# Patient Record
Sex: MALE | Race: BLACK OR AFRICAN AMERICAN | NOT HISPANIC OR LATINO | Employment: UNEMPLOYED | RURAL
[De-identification: names, ages, dates, MRNs, and addresses within clinical notes are randomized per-mention and may not be internally consistent; named-entity substitution may affect disease eponyms.]

---

## 2022-02-03 ENCOUNTER — IMMUNIZATION (OUTPATIENT)
Dept: FAMILY MEDICINE | Facility: CLINIC | Age: 62
End: 2022-02-03
Payer: COMMERCIAL

## 2022-02-03 DIAGNOSIS — Z23 NEED FOR VACCINATION: Primary | ICD-10-CM

## 2022-02-03 PROCEDURE — 91306 COVID-19, MRNA, LNP-S, PF, 100 MCG/0.25 ML DOSE VACCINE (MODERNA BOOSTER): ICD-10-PCS | Mod: ,,, | Performed by: FAMILY MEDICINE

## 2022-02-03 PROCEDURE — 0064A COVID-19, MRNA, LNP-S, PF, 100 MCG/0.25 ML DOSE VACCINE (MODERNA BOOSTER): ICD-10-PCS | Mod: ,,, | Performed by: FAMILY MEDICINE

## 2022-02-03 PROCEDURE — 0064A COVID-19, MRNA, LNP-S, PF, 100 MCG/0.25 ML DOSE VACCINE (MODERNA BOOSTER): CPT | Mod: ,,, | Performed by: FAMILY MEDICINE

## 2022-02-03 PROCEDURE — 91306 COVID-19, MRNA, LNP-S, PF, 100 MCG/0.25 ML DOSE VACCINE (MODERNA BOOSTER): CPT | Mod: ,,, | Performed by: FAMILY MEDICINE

## 2025-04-25 ENCOUNTER — HOSPITAL ENCOUNTER (INPATIENT)
Facility: HOSPITAL | Age: 65
LOS: 7 days | Discharge: HOME | DRG: 057 | End: 2025-05-02
Attending: HOSPITALIST | Admitting: HOSPITALIST
Payer: MEDICARE

## 2025-04-25 DIAGNOSIS — R53.81 DEBILITY: Primary | ICD-10-CM

## 2025-04-25 DIAGNOSIS — I63.9 CVA (CEREBRAL VASCULAR ACCIDENT): ICD-10-CM

## 2025-04-25 PROBLEM — Z91.89 AT RISK FOR IMPAIRED SKIN INTEGRITY: Status: ACTIVE | Noted: 2025-04-25

## 2025-04-25 PROBLEM — Z91.81 AT RISK FOR FALLS: Status: ACTIVE | Noted: 2025-04-25

## 2025-04-25 LAB
GLUCOSE SERPL-MCNC: 159 MG/DL (ref 70–105)
GLUCOSE SERPL-MCNC: 283 MG/DL (ref 70–105)

## 2025-04-25 PROCEDURE — 11000004 HC SNF PRIVATE

## 2025-04-25 PROCEDURE — 97162 PT EVAL MOD COMPLEX 30 MIN: CPT

## 2025-04-25 PROCEDURE — 27000987 HC MATTRESS, MATRIX LOW PROFILE

## 2025-04-25 PROCEDURE — 25000003 PHARM REV CODE 250: Performed by: NURSE PRACTITIONER

## 2025-04-25 PROCEDURE — 82962 GLUCOSE BLOOD TEST: CPT

## 2025-04-25 PROCEDURE — 27000958

## 2025-04-25 PROCEDURE — 63600175 PHARM REV CODE 636 W HCPCS: Performed by: NURSE PRACTITIONER

## 2025-04-25 RX ORDER — OMEPRAZOLE 20 MG/1
20 CAPSULE, DELAYED RELEASE ORAL DAILY
Status: ON HOLD | COMMUNITY
End: 2025-05-02 | Stop reason: HOSPADM

## 2025-04-25 RX ORDER — AMOXICILLIN 250 MG
1 CAPSULE ORAL 2 TIMES DAILY PRN
Status: DISCONTINUED | OUTPATIENT
Start: 2025-04-25 | End: 2025-05-02 | Stop reason: HOSPADM

## 2025-04-25 RX ORDER — DAPAGLIFLOZIN 10 MG/1
10 TABLET, FILM COATED ORAL DAILY
Status: ON HOLD | COMMUNITY
End: 2025-05-02

## 2025-04-25 RX ORDER — ATORVASTATIN CALCIUM 40 MG/1
40 TABLET, FILM COATED ORAL DAILY
Status: DISCONTINUED | OUTPATIENT
Start: 2025-04-26 | End: 2025-05-02 | Stop reason: HOSPADM

## 2025-04-25 RX ORDER — FLUTICASONE PROPIONATE 50 MCG
1 SPRAY, SUSPENSION (ML) NASAL 2 TIMES DAILY
Status: DISCONTINUED | OUTPATIENT
Start: 2025-04-26 | End: 2025-05-02 | Stop reason: HOSPADM

## 2025-04-25 RX ORDER — TAMSULOSIN HYDROCHLORIDE 0.4 MG/1
0.4 CAPSULE ORAL DAILY
Status: ON HOLD | COMMUNITY
End: 2025-05-02

## 2025-04-25 RX ORDER — ACETAMINOPHEN 325 MG/1
650 TABLET ORAL EVERY 6 HOURS PRN
Status: DISCONTINUED | OUTPATIENT
Start: 2025-04-25 | End: 2025-05-01

## 2025-04-25 RX ORDER — NAPROXEN SODIUM 220 MG/1
81 TABLET, FILM COATED ORAL DAILY
Status: ON HOLD | COMMUNITY
End: 2025-05-02 | Stop reason: HOSPADM

## 2025-04-25 RX ORDER — AMLODIPINE BESYLATE 10 MG/1
10 TABLET ORAL DAILY
Status: DISCONTINUED | OUTPATIENT
Start: 2025-04-26 | End: 2025-05-02 | Stop reason: HOSPADM

## 2025-04-25 RX ORDER — METHOCARBAMOL 500 MG/1
500 TABLET, FILM COATED ORAL EVERY 6 HOURS PRN
Status: DISCONTINUED | OUTPATIENT
Start: 2025-04-25 | End: 2025-05-02 | Stop reason: HOSPADM

## 2025-04-25 RX ORDER — GLUCAGON 1 MG
1 KIT INJECTION
Status: DISCONTINUED | OUTPATIENT
Start: 2025-04-25 | End: 2025-05-02 | Stop reason: HOSPADM

## 2025-04-25 RX ORDER — IBUPROFEN 200 MG
16 TABLET ORAL
Status: DISCONTINUED | OUTPATIENT
Start: 2025-04-25 | End: 2025-05-02 | Stop reason: HOSPADM

## 2025-04-25 RX ORDER — INSULIN ASPART 100 [IU]/ML
0-5 INJECTION, SOLUTION INTRAVENOUS; SUBCUTANEOUS
Status: DISCONTINUED | OUTPATIENT
Start: 2025-04-25 | End: 2025-05-02 | Stop reason: HOSPADM

## 2025-04-25 RX ORDER — TAMSULOSIN HYDROCHLORIDE 0.4 MG/1
0.4 CAPSULE ORAL DAILY
Status: DISCONTINUED | OUTPATIENT
Start: 2025-04-25 | End: 2025-05-02 | Stop reason: HOSPADM

## 2025-04-25 RX ORDER — METHOCARBAMOL 750 MG/1
750 TABLET, FILM COATED ORAL EVERY 6 HOURS PRN
Status: ON HOLD | COMMUNITY
End: 2025-05-02 | Stop reason: HOSPADM

## 2025-04-25 RX ORDER — FLUTICASONE PROPIONATE 50 MCG
1 SPRAY, SUSPENSION (ML) NASAL 2 TIMES DAILY
COMMUNITY

## 2025-04-25 RX ORDER — GABAPENTIN 400 MG/1
400 CAPSULE ORAL 3 TIMES DAILY
COMMUNITY

## 2025-04-25 RX ORDER — CALCIUM CARBONATE 200(500)MG
500 TABLET,CHEWABLE ORAL 2 TIMES DAILY PRN
Status: DISCONTINUED | OUTPATIENT
Start: 2025-04-25 | End: 2025-05-02 | Stop reason: HOSPADM

## 2025-04-25 RX ORDER — PANTOPRAZOLE SODIUM 40 MG/1
40 TABLET, DELAYED RELEASE ORAL DAILY
Status: DISCONTINUED | OUTPATIENT
Start: 2025-04-26 | End: 2025-05-02 | Stop reason: HOSPADM

## 2025-04-25 RX ORDER — AMLODIPINE BESYLATE 10 MG/1
10 TABLET ORAL DAILY
Status: ON HOLD | COMMUNITY
End: 2025-05-02

## 2025-04-25 RX ORDER — NAPROXEN SODIUM 220 MG/1
81 TABLET, FILM COATED ORAL ONCE
Status: COMPLETED | OUTPATIENT
Start: 2025-04-26 | End: 2025-04-26

## 2025-04-25 RX ORDER — IBUPROFEN 200 MG
24 TABLET ORAL
Status: DISCONTINUED | OUTPATIENT
Start: 2025-04-25 | End: 2025-05-02 | Stop reason: HOSPADM

## 2025-04-25 RX ORDER — ATORVASTATIN CALCIUM 40 MG/1
40 TABLET, FILM COATED ORAL DAILY
Status: ON HOLD | COMMUNITY
End: 2025-05-02

## 2025-04-25 RX ORDER — TALC
6 POWDER (GRAM) TOPICAL NIGHTLY PRN
Status: DISCONTINUED | OUTPATIENT
Start: 2025-04-25 | End: 2025-05-02 | Stop reason: HOSPADM

## 2025-04-25 RX ORDER — GABAPENTIN 400 MG/1
400 CAPSULE ORAL 3 TIMES DAILY
Status: DISCONTINUED | OUTPATIENT
Start: 2025-04-25 | End: 2025-04-28

## 2025-04-25 RX ADMIN — GABAPENTIN 400 MG: 400 CAPSULE ORAL at 08:04

## 2025-04-25 RX ADMIN — APIXABAN 5 MG: 5 TABLET, FILM COATED ORAL at 08:04

## 2025-04-25 RX ADMIN — INSULIN ASPART 1 UNITS: 100 INJECTION, SOLUTION INTRAVENOUS; SUBCUTANEOUS at 08:04

## 2025-04-25 RX ADMIN — METHOCARBAMOL 500 MG: 500 TABLET ORAL at 10:04

## 2025-04-25 NOTE — SUBJECTIVE & OBJECTIVE
Review of Systems   Constitutional:  Negative for chills and fever.   Eyes: Negative.    Respiratory: Negative.  Negative for apnea, cough, choking, chest tightness, shortness of breath, wheezing and stridor.    Cardiovascular: Negative.  Negative for chest pain, palpitations and leg swelling.   Gastrointestinal: Negative.    Genitourinary: Negative.    Musculoskeletal: Negative.    Skin: Negative.    Neurological:  Positive for weakness.   Psychiatric/Behavioral: Negative.     All other systems reviewed and are negative.    Objective:     Vital Signs (Most Recent):  Temp: 97.8 °F (36.6 °C) (04/25/25 1627)  Pulse: 75 (04/25/25 1627)  Resp: (!) 22 (04/25/25 1627)  BP: 134/88 (04/25/25 1627)  SpO2: 97 % (04/25/25 1627) Vital Signs (24h Range):  Temp:  [97.8 °F (36.6 °C)] 97.8 °F (36.6 °C)  Pulse:  [75] 75  Resp:  [22] 22  SpO2:  [97 %] 97 %  BP: (134)/(88) 134/88     Weight: 79.2 kg (174 lb 8 oz)  Body mass index is 29.95 kg/m².    Intake/Output Summary (Last 24 hours) at 4/25/2025 1741  Last data filed at 4/25/2025 1553  Gross per 24 hour   Intake --   Output 100 ml   Net -100 ml         Physical Exam  Vitals and nursing note reviewed.   Constitutional:       General: He is not in acute distress.     Appearance: Normal appearance. He is normal weight. He is not ill-appearing.   HENT:      Head: Normocephalic.      Mouth/Throat:      Mouth: Mucous membranes are moist.   Eyes:      General: No scleral icterus.     Extraocular Movements: Extraocular movements intact.      Conjunctiva/sclera: Conjunctivae normal.      Pupils: Pupils are equal, round, and reactive to light.   Cardiovascular:      Rate and Rhythm: Normal rate and regular rhythm.      Pulses: Normal pulses.      Heart sounds: Normal heart sounds. No murmur heard.     No friction rub. No gallop.   Pulmonary:      Effort: Pulmonary effort is normal.      Breath sounds: Normal breath sounds. No wheezing, rhonchi or rales.   Abdominal:      General: Abdomen is  "flat. Bowel sounds are normal. There is no distension.      Tenderness: There is no abdominal tenderness.   Musculoskeletal:         General: No tenderness. Normal range of motion.      Cervical back: Normal range of motion and neck supple. No rigidity.   Skin:     General: Skin is warm and dry.      Capillary Refill: Capillary refill takes less than 2 seconds.   Neurological:      Mental Status: He is alert.      GCS: GCS eye subscore is 4. GCS verbal subscore is 5. GCS motor subscore is 6.      Motor: Weakness present.      Comments: Right side weakness upper and lower extremity   Psychiatric:         Mood and Affect: Mood normal.         Behavior: Behavior normal.         Thought Content: Thought content normal.         Judgment: Judgment normal.               Significant Labs: All pertinent labs within the past 24 hours have been reviewed.  POCT Glucose: No results for input(s): "POCTGLUCOSE" in the last 48 hours.    Significant Imaging: I have reviewed all pertinent imaging results/findings within the past 24 hours.  "

## 2025-04-25 NOTE — PT/OT/SLP EVAL
Physical Therapy Evaluation    Patient Name:  Jono Keita   MRN:  [id number]    Recommendations:     Discharge Recommendations:     Discharge Equipment Recommendations: none   Barriers to discharge:  Poor functional mobility    Assessment:     Jono Keita is a 64 y.o. male admitted with a medical diagnosis of <principal problem not specified>.  He presents with the following impairments/functional limitations: weakness, impaired endurance, impaired functional mobility, gait instability, impaired balance .Patient needed min assist with gait with Rwx 100 feet. Patient has RLE weakness and mild RLE drag and circumduction with gait. Patient will benefit from gait , balance , strength and transfer training    Rehab Prognosis: Good; patient would benefit from acute skilled PT services to address these deficits and reach maximum level of function.    Recent Surgery: * No surgery found *      Plan:     During this hospitalization, patient to be seen 5 x/week to address the identified rehab impairments via gait training, therapeutic activities, therapeutic exercises, neuromuscular re-education, wheelchair management/training and progress toward the following goals:    Plan of Care Expires:  05/23/25    Subjective     Chief Complaint: Difficulty with gait  Patient/Family Comments/goals: patient wants to walk without assistance.   Pain/Comfort:  Pain Rating 1: 0/10  Pain Rating Post-Intervention 1: 0/10    Patients cultural, spiritual, Spiritism conflicts given the current situation: yes    Living Environment:  Patient lives in a mobile Home with wife.  Prior to admission, patients level of function was independent with community ambulation.  Equipment used at home:  .  DME owned (not currently used): none.  Upon discharge, patient will have assistance from wife.    Objective:     Communicated with RN prior to session.  Patient found supine with    upon PT entry to room.    General Precautions: Standard, fall  Orthopedic  Precautions:N/A   Braces: N/A  Respiratory Status: Room air    Exams:  RUE Strength: 4-/5  LUE Strength: 4/5  RLE Strength: 3//5 DF and knee flexion 3-/5  LLE Strength: 4/5    Functional Mobility:  Bed Mobility:     Supine to Sit: stand by assistance  Transfers:     Sit to Stand:  minimum assistance with rolling walker  Gait: min assist x 100 feet with RW with mild LE drag and circumduction      AM-PAC 6 CLICK MOBILITY  Total Score:18       Treatment & Education:  Initial Evaluation performed    Patient left up in chair with all lines intact and call button in reach.    GOALS:   Multidisciplinary Problems       Physical Therapy Goals          Problem: Physical Therapy    Goal Priority Disciplines Outcome Interventions   Physical Therapy Goal     PT, PT/OT     Description: Short Term Goals (1 Week)  Patient will be modified independent with sit to stand with use of RW.     Long Term Goals (4 Weeks)  Patient will need CGA with gait with straight cane  x 350 feet on level surfaces.  Patient will be independent with commode transfer.  Patient will need SBA with negotiating 4 steps with rail .                        DME Justifications:  TBD    History:     History reviewed. No pertinent past medical history.    Past Surgical History:   Procedure Laterality Date    BACK SURGERY      KNEE SURGERY         Time Tracking:     PT Received On: 04/25/25  PT Start Time: 1520     PT Stop Time: 1550  PT Total Time (min): 30 min     Billable Minutes: Evaluation 30      04/25/2025

## 2025-04-25 NOTE — HOSPITAL COURSE
4/25 Patient sitting up in WC talking on phone in NAD with wife at bedside. Patient is pleasant an answers questions appropriately. He and his wife express understanding of plan of treatment and agree with plan.    5.2 Did really well and has improved.  Stable for DC home. Follow up with PCP.  DC with Farxiga instead of Lantus.  But PCP can track and see if more needed.

## 2025-04-25 NOTE — PROGRESS NOTES
[facility] Medicine  Progress Note    Patient Name: Jono Keita  MRN: [id number]  Patient Class: IP- Swing   Admission Date: 4/25/2025  Length of Stay: 0 days  Attending Physician: Rico Braden DO  Primary Care Provider: Leon Barr MD        Subjective     Principal Problem:<principal problem not specified>        HPI:  Mr. Keita is a 65 y/o AAM with PMH of HTN, T2DM, Stage 3 CKD, CVA, BPH, and HLD who presents from [facility] to [facility] for PT/OT/ST due to debility related to recent CVA. Patient suffered a left pontine stroke on 4/12/25. Patient states an additional previous stroke in the past.  His recent stroke resulted in residual right hemiparesis.    Overview/Hospital Course:  4/25 Patient sitting up in WC talking on phone in NAD with wife at bedside. Patient is pleasant an answers questions appropriately. He and his wife express understanding of plan of treatment and agree with plan.    Review of Systems   Constitutional:  Negative for chills and fever.   Eyes: Negative.    Respiratory: Negative.  Negative for apnea, cough, choking, chest tightness, shortness of breath, wheezing and stridor.    Cardiovascular: Negative.  Negative for chest pain, palpitations and leg swelling.   Gastrointestinal: Negative.    Genitourinary: Negative.    Musculoskeletal: Negative.    Skin: Negative.    Neurological:  Positive for weakness.   Psychiatric/Behavioral: Negative.     All other systems reviewed and are negative.    Objective:     Vital Signs (Most Recent):  Temp: 97.8 °F (36.6 °C) (04/25/25 1627)  Pulse: 75 (04/25/25 1627)  Resp: (!) 22 (04/25/25 1627)  BP: 134/88 (04/25/25 1627)  SpO2: 97 % (04/25/25 1627) Vital Signs (24h Range):  Temp:  [97.8 °F (36.6 °C)] 97.8 °F (36.6 °C)  Pulse:  [75] 75  Resp:  [22] 22  SpO2:  [97 %] 97 %  BP: (134)/(88) 134/88     Weight: 79.2 kg (174 lb 8 oz)  Body mass index is 29.95 kg/m².    Intake/Output Summary  "(Last 24 hours) at 4/25/2025 1741  Last data filed at 4/25/2025 1553  Gross per 24 hour   Intake --   Output 100 ml   Net -100 ml         Physical Exam  Vitals and nursing note reviewed.   Constitutional:       General: He is not in acute distress.     Appearance: Normal appearance. He is normal weight. He is not ill-appearing.   HENT:      Head: Normocephalic.      Mouth/Throat:      Mouth: Mucous membranes are moist.   Eyes:      General: No scleral icterus.     Extraocular Movements: Extraocular movements intact.      Conjunctiva/sclera: Conjunctivae normal.      Pupils: Pupils are equal, round, and reactive to light.   Cardiovascular:      Rate and Rhythm: Normal rate and regular rhythm.      Pulses: Normal pulses.      Heart sounds: Normal heart sounds. No murmur heard.     No friction rub. No gallop.   Pulmonary:      Effort: Pulmonary effort is normal.      Breath sounds: Normal breath sounds. No wheezing, rhonchi or rales.   Abdominal:      General: Abdomen is flat. Bowel sounds are normal. There is no distension.      Tenderness: There is no abdominal tenderness.   Musculoskeletal:         General: No tenderness. Normal range of motion.      Cervical back: Normal range of motion and neck supple. No rigidity.   Skin:     General: Skin is warm and dry.      Capillary Refill: Capillary refill takes less than 2 seconds.   Neurological:      Mental Status: He is alert.      GCS: GCS eye subscore is 4. GCS verbal subscore is 5. GCS motor subscore is 6.      Motor: Weakness present.      Comments: Right side weakness upper and lower extremity   Psychiatric:         Mood and Affect: Mood normal.         Behavior: Behavior normal.         Thought Content: Thought content normal.         Judgment: Judgment normal.               Significant Labs: All pertinent labs within the past 24 hours have been reviewed.  POCT Glucose: No results for input(s): "POCTGLUCOSE" in the last 48 hours.    Significant Imaging: I have " reviewed all pertinent imaging results/findings within the past 24 hours.      Assessment & Plan  At risk for falls      At risk for venous thromboembolism (VTE)  - Maintain skin and mucous membrane integrity  - Promote hand, oral, and pulmonary hygiene  - Optimize fluid balance, nutrition, sleep, glycemic control to maximize infection resistance  - Identify patient sources of infection early to prevent or mitigate progression of infection        VTE Risk Mitigation (From admission, onward)           Ordered     apixaban tablet 5 mg  2 times daily         04/25/25 1500     IP VTE LOW RISK PATIENT  Once         04/25/25 1459                    Discharge Planning   SIN:      Code Status: Full Code   Medical Readiness for Discharge Date:                    Please place Justification for DME        JENNIFER Calvo  Department of Hospital Medicine   [facility]

## 2025-04-25 NOTE — ASSESSMENT & PLAN NOTE
- Maintain skin and mucous membrane integrity  - Promote hand, oral, and pulmonary hygiene  - Optimize fluid balance, nutrition, sleep, glycemic control to maximize infection resistance  - Identify patient sources of infection early to prevent or mitigate progression of infection

## 2025-04-25 NOTE — PLAN OF CARE
Short Term Goals (1 Week)  Patient will be modified independent with sit to stand with use of RW.     Long Term Goals (4 Weeks)  Patient will need CGA with gait with straight cane  x 350 feet on level surfaces.  Patient will be independent with commode transfer.  Patient will need SBA with negotiating 4 steps with rail .

## 2025-04-25 NOTE — HPI
Mr. Keita is a 65 y/o AAM with PMH of HTN, T2DM, Stage 3 CKD, CVA, BPH, and HLD who presents from [facility] to [facility] for PT/OT/ST due to debility related to recent CVA. Patient suffered a left pontine stroke on 4/12/25. Patient states an additional previous stroke in the past.  His recent stroke resulted in residual right hemiparesis.

## 2025-04-26 LAB
ANION GAP SERPL CALCULATED.3IONS-SCNC: 15 MMOL/L (ref 7–16)
BASOPHILS # BLD AUTO: 0.01 K/UL (ref 0–0.2)
BASOPHILS NFR BLD AUTO: 0.2 % (ref 0–1)
BUN SERPL-MCNC: 33 MG/DL (ref 8–26)
BUN/CREAT SERPL: 19 (ref 6–20)
CALCIUM SERPL-MCNC: 8.8 MG/DL (ref 8.8–10)
CHLORIDE SERPL-SCNC: 110 MMOL/L (ref 98–107)
CO2 SERPL-SCNC: 20 MMOL/L (ref 23–31)
CREAT SERPL-MCNC: 1.7 MG/DL (ref 0.72–1.25)
DIFFERENTIAL METHOD BLD: ABNORMAL
EGFR (NO RACE VARIABLE) (RUSH/TITUS): 44 ML/MIN/1.73M2
EOSINOPHIL # BLD AUTO: 0.11 K/UL (ref 0–0.5)
EOSINOPHIL NFR BLD AUTO: 2.2 % (ref 1–4)
ERYTHROCYTE [DISTWIDTH] IN BLOOD BY AUTOMATED COUNT: 13.1 % (ref 11.5–14.5)
GLUCOSE SERPL-MCNC: 164 MG/DL (ref 82–115)
GLUCOSE SERPL-MCNC: 249 MG/DL (ref 70–105)
GLUCOSE SERPL-MCNC: 253 MG/DL (ref 70–105)
GLUCOSE SERPL-MCNC: 263 MG/DL (ref 70–105)
HCT VFR BLD AUTO: 44.2 % (ref 40–54)
HGB BLD-MCNC: 14.5 G/DL (ref 13.5–18)
LYMPHOCYTES # BLD AUTO: 1.19 K/UL (ref 1–4.8)
LYMPHOCYTES NFR BLD AUTO: 23.6 % (ref 27–41)
MCH RBC QN AUTO: 29 PG (ref 27–31)
MCHC RBC AUTO-ENTMCNC: 32.8 G/DL (ref 32–36)
MCV RBC AUTO: 88.4 FL (ref 80–96)
MONOCYTES # BLD AUTO: 0.62 K/UL (ref 0–0.8)
MONOCYTES NFR BLD AUTO: 12.3 % (ref 2–6)
MPC BLD CALC-MCNC: 9.5 FL (ref 9.4–12.4)
NEUTROPHILS # BLD AUTO: 3.11 K/UL (ref 1.8–7.7)
NEUTROPHILS NFR BLD AUTO: 61.7 % (ref 53–65)
PLATELET # BLD AUTO: 211 K/UL (ref 150–400)
POTASSIUM SERPL-SCNC: 4.8 MMOL/L (ref 3.5–5.1)
RBC # BLD AUTO: 5 M/UL (ref 4.6–6.2)
SODIUM SERPL-SCNC: 140 MMOL/L (ref 136–145)
WBC # BLD AUTO: 5.04 K/UL (ref 4.5–11)

## 2025-04-26 PROCEDURE — 11000004 HC SNF PRIVATE

## 2025-04-26 PROCEDURE — 25000003 PHARM REV CODE 250: Performed by: NURSE PRACTITIONER

## 2025-04-26 PROCEDURE — 63600175 PHARM REV CODE 636 W HCPCS: Performed by: NURSE PRACTITIONER

## 2025-04-26 PROCEDURE — 27000958

## 2025-04-26 PROCEDURE — 80048 BASIC METABOLIC PNL TOTAL CA: CPT | Performed by: NURSE PRACTITIONER

## 2025-04-26 PROCEDURE — 25000242 PHARM REV CODE 250 ALT 637 W/ HCPCS: Performed by: NURSE PRACTITIONER

## 2025-04-26 PROCEDURE — 85025 COMPLETE CBC W/AUTO DIFF WBC: CPT | Performed by: NURSE PRACTITIONER

## 2025-04-26 PROCEDURE — 36415 COLL VENOUS BLD VENIPUNCTURE: CPT | Performed by: NURSE PRACTITIONER

## 2025-04-26 PROCEDURE — 27000987 HC MATTRESS, MATRIX LOW PROFILE

## 2025-04-26 PROCEDURE — 82962 GLUCOSE BLOOD TEST: CPT

## 2025-04-26 RX ADMIN — INSULIN ASPART 3 UNITS: 100 INJECTION, SOLUTION INTRAVENOUS; SUBCUTANEOUS at 11:04

## 2025-04-26 RX ADMIN — AMLODIPINE BESYLATE 10 MG: 10 TABLET ORAL at 08:04

## 2025-04-26 RX ADMIN — APIXABAN 5 MG: 5 TABLET, FILM COATED ORAL at 08:04

## 2025-04-26 RX ADMIN — GABAPENTIN 400 MG: 400 CAPSULE ORAL at 08:04

## 2025-04-26 RX ADMIN — GABAPENTIN 400 MG: 400 CAPSULE ORAL at 03:04

## 2025-04-26 RX ADMIN — ASPIRIN 81 MG: 81 TABLET, CHEWABLE ORAL at 08:04

## 2025-04-26 RX ADMIN — TAMSULOSIN HYDROCHLORIDE 0.4 MG: 0.4 CAPSULE ORAL at 08:04

## 2025-04-26 RX ADMIN — PANTOPRAZOLE SODIUM 40 MG: 40 TABLET, DELAYED RELEASE ORAL at 08:04

## 2025-04-26 RX ADMIN — FLUTICASONE PROPIONATE 50 MCG: 50 SPRAY, METERED NASAL at 08:04

## 2025-04-26 RX ADMIN — ATORVASTATIN CALCIUM 40 MG: 40 TABLET, FILM COATED ORAL at 08:04

## 2025-04-26 RX ADMIN — INSULIN ASPART 1 UNITS: 100 INJECTION, SOLUTION INTRAVENOUS; SUBCUTANEOUS at 08:04

## 2025-04-26 RX ADMIN — INSULIN ASPART 2 UNITS: 100 INJECTION, SOLUTION INTRAVENOUS; SUBCUTANEOUS at 05:04

## 2025-04-26 RX ADMIN — METHOCARBAMOL 500 MG: 500 TABLET ORAL at 08:04

## 2025-04-26 NOTE — PLAN OF CARE
Problem: Adult Inpatient Plan of Care  Goal: Plan of Care Review  Outcome: Progressing  Goal: Patient-Specific Goal (Individualized)  Outcome: Progressing  Goal: Absence of Hospital-Acquired Illness or Injury  Outcome: Progressing  Goal: Optimal Comfort and Wellbeing  Outcome: Progressing  Goal: Readiness for Transition of Care  Outcome: Progressing     Problem: Fall Injury Risk  Goal: Absence of Fall and Fall-Related Injury  Outcome: Progressing     Problem: Stroke Rehabilitation  Goal: Optimal Adjustment to Stroke  Outcome: Progressing  Goal: Optimal Safe BADL Performance  Outcome: Progressing  Goal: Effective Bowel Elimination/Continence  Outcome: Progressing  Goal: Optimal Cognitive Function  Outcome: Progressing  Goal: Effective Communication Skills  Outcome: Progressing  Goal: Optimal Safe IADL Performance  Outcome: Progressing  Goal: Optimal Mobility [place] and Safety  Outcome: Progressing  Goal: Optimal Movement and Motor Control  Outcome: Progressing  Goal: Optimal Nutrition Intake  Outcome: Progressing  Goal: Optimal Sensory Perceptual Status  Outcome: Progressing  Goal: Maintains Intimacy/Sexual Expression  Outcome: Progressing  Goal: Effective Spasticity Management  Outcome: Progressing  Goal: Safe and Effective Swallow  Outcome: Progressing  Goal: Effective Urinary Elimination/Continence  Outcome: Progressing

## 2025-04-26 NOTE — NURSING
Pt's f/u appointments are listed below:     5/7 Ramos Christian NP @2 PM ([facility])    7/30 Mike Nation MD @11 AM (Urology)      Appointments added to AVS and updated white board in room. Family made aware of appointments.

## 2025-04-27 LAB
GLUCOSE SERPL-MCNC: 176 MG/DL (ref 70–105)
GLUCOSE SERPL-MCNC: 242 MG/DL (ref 70–105)
GLUCOSE SERPL-MCNC: 247 MG/DL (ref 70–105)
GLUCOSE SERPL-MCNC: 392 MG/DL (ref 70–105)

## 2025-04-27 PROCEDURE — 97166 OT EVAL MOD COMPLEX 45 MIN: CPT

## 2025-04-27 PROCEDURE — 82962 GLUCOSE BLOOD TEST: CPT

## 2025-04-27 PROCEDURE — 27000958

## 2025-04-27 PROCEDURE — 11000004 HC SNF PRIVATE

## 2025-04-27 PROCEDURE — 63600175 PHARM REV CODE 636 W HCPCS: Performed by: NURSE PRACTITIONER

## 2025-04-27 PROCEDURE — 27000987 HC MATTRESS, MATRIX LOW PROFILE

## 2025-04-27 PROCEDURE — 25000003 PHARM REV CODE 250: Performed by: NURSE PRACTITIONER

## 2025-04-27 RX ADMIN — APIXABAN 5 MG: 5 TABLET, FILM COATED ORAL at 08:04

## 2025-04-27 RX ADMIN — METHOCARBAMOL 500 MG: 500 TABLET ORAL at 08:04

## 2025-04-27 RX ADMIN — ATORVASTATIN CALCIUM 40 MG: 40 TABLET, FILM COATED ORAL at 08:04

## 2025-04-27 RX ADMIN — PANTOPRAZOLE SODIUM 40 MG: 40 TABLET, DELAYED RELEASE ORAL at 08:04

## 2025-04-27 RX ADMIN — FLUTICASONE PROPIONATE 50 MCG: 50 SPRAY, METERED NASAL at 08:04

## 2025-04-27 RX ADMIN — INSULIN ASPART 2 UNITS: 100 INJECTION, SOLUTION INTRAVENOUS; SUBCUTANEOUS at 10:04

## 2025-04-27 RX ADMIN — GABAPENTIN 400 MG: 400 CAPSULE ORAL at 08:04

## 2025-04-27 RX ADMIN — INSULIN ASPART 2 UNITS: 100 INJECTION, SOLUTION INTRAVENOUS; SUBCUTANEOUS at 04:04

## 2025-04-27 RX ADMIN — AMLODIPINE BESYLATE 10 MG: 10 TABLET ORAL at 08:04

## 2025-04-27 RX ADMIN — INSULIN ASPART 3 UNITS: 100 INJECTION, SOLUTION INTRAVENOUS; SUBCUTANEOUS at 08:04

## 2025-04-27 RX ADMIN — GABAPENTIN 400 MG: 400 CAPSULE ORAL at 03:04

## 2025-04-27 RX ADMIN — TAMSULOSIN HYDROCHLORIDE 0.4 MG: 0.4 CAPSULE ORAL at 08:04

## 2025-04-27 NOTE — PLAN OF CARE
Problem: Occupational Therapy  Goal: Occupational Therapy Goal  Description: STG:  Pt will perform grooming with setup  Pt will bathe with I after set up  Pt will perform UE dressing with I  Pt will perform LE dressing with I  Pt will sit EOB x 15 min with no assistance  Pt will transfer bed/chair/bsc with Mod I  Pt will perform standing task x 15 min with supervision assistance  Pt will tolerate 45 minutes of tx without fatigue      LT.Restore to max I with self care and mobility.    Outcome: Progressing

## 2025-04-27 NOTE — PLAN OF CARE
Problem: Adult Inpatient Plan of Care  Goal: Plan of Care Review  Outcome: Progressing  Goal: Patient-Specific Goal (Individualized)  Outcome: Progressing  Goal: Absence of Hospital-Acquired Illness or Injury  Outcome: Progressing  Goal: Optimal Comfort and Wellbeing  Outcome: Progressing  Goal: Readiness for Transition of Care  Outcome: Progressing     Problem: Fall Injury Risk  Goal: Absence of Fall and Fall-Related Injury  Outcome: Progressing     Problem: Stroke Rehabilitation  Goal: Optimal Adjustment to Stroke  Outcome: Progressing  Goal: Optimal Safe BADL Performance  Outcome: Progressing  Goal: Effective Bowel Elimination/Continence  Outcome: Progressing  Goal: Optimal Cognitive Function  Outcome: Progressing  Goal: Effective Communication Skills  Outcome: Progressing  Goal: Optimal Safe IADL Performance  Outcome: Progressing  Goal: Optimal Mobility [place] and Safety  Outcome: Progressing  Goal: Optimal Movement and Motor Control  Outcome: Progressing  Goal: Optimal Nutrition Intake  Outcome: Progressing  Goal: Optimal Sensory Perceptual Status  Outcome: Progressing  Goal: Maintains Intimacy/Sexual Expression  Outcome: Progressing  Goal: Effective Spasticity Management  Outcome: Progressing  Goal: Safe and Effective Swallow  Outcome: Progressing  Goal: Effective Urinary Elimination/Continence  Outcome: Progressing

## 2025-04-27 NOTE — PLAN OF CARE
Problem: Adult Inpatient Plan of Care  Goal: Plan of Care Review  Outcome: Progressing  Goal: Patient-Specific Goal (Individualized)  Outcome: Progressing  Goal: Absence of Hospital-Acquired Illness or Injury  Outcome: Progressing  Goal: Optimal Comfort and Wellbeing  Outcome: Progressing  Goal: Readiness for Transition of Care  Outcome: Progressing     Problem: Fall Injury Risk  Goal: Absence of Fall and Fall-Related Injury  Outcome: Progressing     Problem: Stroke Rehabilitation  Goal: Optimal Adjustment to Stroke  Outcome: Progressing  Goal: Optimal Safe BADL Performance  Outcome: Progressing  Goal: Effective Bowel Elimination/Continence  Outcome: Progressing  Goal: Optimal Cognitive Function  Outcome: Progressing  Goal: Effective Communication Skills  Outcome: Progressing  Goal: Optimal Safe IADL Performance  Outcome: Progressing  Goal: Optimal Mobility [place] and Safety  Outcome: Progressing  Goal: Optimal Movement and Motor Control  Outcome: Progressing  Goal: Optimal Nutrition Intake  Outcome: Progressing  Goal: Optimal Sensory Perceptual Status  Outcome: Progressing  Goal: Maintains Intimacy/Sexual Expression  Outcome: Progressing  Goal: Effective Spasticity Management  Outcome: Progressing  Goal: Safe and Effective Swallow  Outcome: Progressing  Goal: Effective Urinary Elimination/Continence  Outcome: Progressing

## 2025-04-27 NOTE — CONSULTS
[facility]  Adult Nutrition  First Assessment Note         Reason for Assessment  Reason For Assessment: consult        Assessment and Plan  Consult received and appreciated. Consult to assess/educate regarding nutritional needs. Patient is a 65 yo M with PMH of HTN, T2DM, Stage 3 CKD, CVA, BPH, and HLD who presents from [facility] due to debility related to recent CVA. Patient suffered a left pontine stroke on 4/12/25. Patient noted to have additional previous stroke in past. Recent stroke resulted in residual right hemiparesis.    Patient is 79.1 kg (174 lb) with a BMI of 29.95 and is overweight. Chart review reveals limited weight history. Per malnutrition screening tool, patient denied weight loss and poor appetite (MST 0). Patient is currently on a Consistent Carbohydrate Diet and is consuming 100% of meals per flowsheet.     Nutrition Recommendations  Recommend to change diet order to Heart-Healthy, Consistent Carbohydrate Diet given patient admitted secondary to stroke with history of additional strokes. Patient would benefit from diet education related to a cardiac, diabetic-friendly diet upon follow-up when next on-site prior to discharge. Will attach nutrition education materials to discharge instructions for time being.     Last Bowel Movement: 04/25/25    Medications/labs reviewed. RD following.    Learning Needs/Social Determinants of Health    Learning Assessment       04/25/2025 1505 [facility] (4/25/2025 - Present)   Created by Rozina Burger, RN - RN (Nurse) Status: Complete                 PRIMARY LEARNER     Primary Learner Name:  Jono Keita  - 04/25/2025 1505    Relationship:  Patient AG - 04/25/2025 1505    Does the primary learner have any barriers to learning?:  No Barriers  - 04/25/2025 1505    What is the preferred language of the primary learner?:  English  - 04/25/2025 1505    Is an  required?:  No   - 04/25/2025 1505    How does the primary learner prefer to learn new concepts?:  Listening  - 04/25/2025 1505    How often do you need to have someone help you read instructions, pamphlets, or written material from your doctor or pharmacy?:  Never  - 04/25/2025 1505        CO-LEARNER #1     No question answered        CO-LEARNER #2     No question answered        SPECIAL TOPICS     No question answered        ANSWERED BY:     No question answered        Comments         Edit History       Rozina Burger RN - RN (Nurse)   04/25/2025 1505                          Social Drivers of Health     Tobacco Use: Medium Risk (4/25/2025)    Patient History     Smoking Tobacco Use: Unknown     Smokeless Tobacco Use: Former     Passive Exposure: Not on file   Alcohol Use: Alcohol Misuse (4/12/2025)    Received from [facility] Subsidi[facility] Aff[facility]    AUDIT-C     Frequency of Alcohol Consumption: 2-3 times a week     Average Number of Drinks: 1 or 2     Frequency of Binge Drinking: Weekly   Financial Resource Strain: Low Risk  (4/25/2025)    Overall Financial Resource Strain (CARDIA)     Difficulty of Paying Living Expenses: Not very hard   Food Insecurity: No Food Insecurity (4/25/2025)    Hunger Vital Sign     Worried About Running Out of Food in the Last Year: Never true     Ran Out of Food in the Last Year: Never true   Transportation Needs: No Transportation Needs (4/25/2025)    PRAPARE - Transportation     Lack of Transportation (Medical): No     Lack of Transportation (Non-Medical): No   Physical Activity: Not on file   Stress: No Stress Concern Present (4/25/2025)    [employer] - Occupational Stress Questionnaire     Feeling of Stress : Only a little   Housing Stability: Low Risk  (4/25/2025)    Housing Stability Vital Sign     Unable to Pay for Housing in the Last Year: No     Number of Times Moved in the Last Year: Not on file      Homeless in the Last Year: No   Depression: Not at risk (4/12/2025)    Received from [facility]sidiaries and Affiliates    PHQ-2     PHQ-2 Total: 0   Utilities: Not At Risk (4/25/2025)    [facility] Utilities     Threatened with loss of utilities: No   Health Literacy: Adequate Health Literacy (4/25/2025)     Health Literacy     Frequency of need for help with medical instructions: Rarely   Social Isolation: Not on file     Malnutrition  Is Patient Malnourished: No    Nutrition Diagnosis  Decreased nutrient needs (sodium, saturated fat) related to cardiac dysfunction as evidenced by multiple strokes, HTN    Recent Labs   Lab 04/26/25  0504 04/26/25  1058 04/26/25  1655   *  --   --    POCGLU  --    < > 249*    < > = values in this interval not displayed.     Comments on Glucose: [facility] T2DM    Nutrition Prescription / Recommendations  Recommendation/Intervention: Recommend to change diet order to Heart-Healthy, Consistent CHO. Patient would benefit from diet education related to heart-healthy, diabetic nutrition therapy prior to discharge  Goals: Weight maintenance during admission, consuming 50 - 75 % meals, improve blood glucose levels  Nutrition Goal Status: new  Current Diet Order: Consistent Carbohydrate Diet  Chewing or Swallowing Difficulty?: No Chewing or swallowing difficulty  Recommended Diet: Consistent Carbohydrate 2000 (75g Carbs) and Heart Healthy  Recommended Oral Supplement: No Oral Supplements  Is Nutrition Support Recommended: No  Is Nutrition Education Recommended: Yes - Type: Heart-Healthy, Diabetic Medical Nutrition Therapy - Education Given: no - not on site currently    Monitor and Evaluation  % current Intake: P.O. intake of 75 - 100 %  % intake to meet estimated needs: 50 - 75 %  Monitor and Evaluation: Food and beverage intake, Protein intake, Carbohydrate intake, Diet order, Food and nutrition knowledge, Weight, Beliefs and attitudes,  "Electrolyte and renal panel, Gastrointestinal profile, Glucose/endocrine profile, Inflammatory profile, Lipid profile, Nutrition focused physical findings    Current Medical Diagnosis and Past Medical History     History reviewed. No pertinent past medical history.    Nutrition/Diet History  Spiritual, Cultural Beliefs, Mandaeism Practices, Values that Affect Care: yes  Food Allergies: NKFA    Lab/Procedures/Meds  Recent Labs   Lab 04/26/25  0504      K 4.8   BUN 33*   CREATININE 1.70*   CALCIUM 8.8   *   Note: BUN, Cr high (Stage 3 CKD). Cl- high (possibly related to renal dysfunction, meds)    Last A1c:   Lab Results   Component Value Date    HGBA1C 9.3 (H) 04/13/2025   Note: A1c elevated. Goal for individuals with diabetes < 7     Lab Results   Component Value Date    RBC 5.00 04/26/2025    HGB 14.5 04/26/2025    HCT 44.2 04/26/2025    MCV 88.4 04/26/2025    MCH 29.0 04/26/2025    MCHC 32.8 04/26/2025     Pertinent Labs Reviewed: reviewed  Pertinent Medications Reviewed: reviewed    Scheduled Meds:   amLODIPine  10 mg Oral Daily    apixaban  5 mg Oral BID    atorvastatin  40 mg Oral Daily    fluticasone propionate  1 spray Each Nostril BID    gabapentin  400 mg Oral TID    pantoprazole  40 mg Oral Daily    tamsulosin  0.4 mg Oral Daily   Note: atorvastatin (not with grapefruit/grapefruit juice)    Continuous Infusions:  PRN Meds:.  Current Facility-Administered Medications:     acetaminophen, 650 mg, Oral, Q6H PRN    calcium carbonate, 500 mg, Oral, BID PRN    dextrose 50%, 12.5 g, Intravenous, PRN    dextrose 50%, 25 g, Intravenous, PRN    glucagon (human recombinant), 1 mg, Intramuscular, PRN    glucose, 16 g, Oral, PRN    glucose, 24 g, Oral, PRN    insulin aspart U-100, 0-5 Units, Subcutaneous, QID (AC + HS) PRN    melatonin, 6 mg, Oral, Nightly PRN    methocarbamoL, 500 mg, Oral, Q6H PRN    senna-docusate, 1 tablet, Oral, BID PRN    Anthropometrics  Height: 5' 4" (162.6 cm)  Height (inches): 64 " in  Height Method: Stated  Weight: 79.1 kg (174 lb 7.6 oz)  Weight (lb): 174.48 lb  Weight Method: Bed Scale  Ideal Body Weight (IBW), Male: 130 lb  % Ideal Body Weight, Male (lb): 134.23 %  BMI (Calculated): 29.9       Estimated/Assessed Needs  RMR ([place]-St. Jeor Equation): 1487.41     Temp: 97.8 °F (36.6 °C)Oral  Weight Used For Calorie Calculations: 79.1 kg (174 lb 6.1 oz)     Energy Calorie Requirements (kcal): 2,000 - 2,400 kcal (25 - 30 kcal/kg ABW)    Weight Used For Protein Calculations: 79.1 kg (174 lb 6.1 oz)  Protein Requirements: 79 - 95 g (1 - 1.2 g/kg ABW) (increased protein needs 2/2 older age, preservation muscle mass)      Fluid Requirements (mL): 1 mL/kcal  CHO Requirement: 45 - 55 % total kcal (T2DM)    Nutrition by Nursing  Diet/Nutrition Received: consistent carb/diabetic diet  Intake (%): 100%  Diet/Feeding Assistance: tray set-up  Diet/Feeding Tolerance: good                Nutrition Follow-Up  RD Follow-up?: Yes    Nutrition Discharge Planning: Therapeutic diet (comments)  Therapeutic diet (comments): Heart-Healthy, Consistent-Carbohydrate Diet - diet low in saturated fats, low in sodium, and high in fiber. Encourage patient to make half of his plate non-starchy vegetables with quarter of plate lean protein and other quarter whole grains, fruits, starchy vegetables. Avoid surgary beverages. Try to consume a similar amount of carbohydrates at each meal with a source of fiber/protein to help stabilize blood glucose levels. Patient would benefit from working with outpatient dietitian if interested    Jessica Aparicio MS, RD, LD  Available via Secure Chat

## 2025-04-27 NOTE — PT/OT/SLP EVAL
Occupational Therapy   Evaluation    Name: Jono Keita  MRN: [id number]  Admitting Diagnosis: <principal problem not specified>  Recent Surgery: * No surgery found *      Recommendations:     Discharge Recommendations: Moderate Intensity Therapy  Discharge Equipment Recommendations:  none  Barriers to discharge:  None    Assessment:     Jono Keita is a 64 y.o. male with a medical diagnosis of s/p L CVA with R sided residual weakness in .  He presents with weakness. Performance deficits affecting function: weakness, impaired endurance, impaired self care skills, impaired functional mobility, gait instability, impaired balance, decreased coordination.      Rehab Prognosis: Good; patient would benefit from acute skilled OT services to address these deficits and reach maximum level of function.       Plan:     Patient to be seen 5 x/week to address the above listed problems via self-care/home management, therapeutic activities, therapeutic exercises, neuromuscular re-education  Plan of Care Expires:    Plan of Care Reviewed with: patient, spouse    Subjective     Chief Complaint: Pt had no complaints  Patient/Family Comments/goals: return home    Occupational Profile:  Living Environment: Pt lives in mobile home with wife  Previous level of function: Pt was I with all ADLs  Roles and Routines: self care  Equipment Used at Home: none  Assistance upon Discharge: Pt will have assist from spouse    Pain/Comfort:  Pain Rating 1: 0/10    Patients cultural, spiritual, Catholic conflicts given the current situation:      Objective:     Communicated with: nursing prior to session.  Patient found up in chair with   upon OT entry to room.    General Precautions: Standard, fall  Orthopedic Precautions:    Braces:    Respiratory Status: Room air    Occupational Performance:    Bed Mobility:        Functional Mobility/Transfers:  Patient completed Sit <> Stand Transfer with contact guard assistance  with  no assistive device    Functional Mobility:     Activities of Daily Living:  Lower Body Dressing: supervision Pt doffed/donned socks    Cognitive/Visual Perceptual:  Cognitive/Psychosocial Skills:     -       Follows Commands/attention:Follows multistep  commands    Physical Exam:  Dominant hand:    -       R  Upper Extremity Range of Motion:     -       Right Upper Extremity: WFL  -       Left Upper Extremity: WFL  Upper Extremity Strength:    -       Right Upper Extremity: 3/5  -       Left Upper Extremity: WFL    AMPAC 6 Click ADL:  AMPA Total Score: 20    Treatment & Education:  Pt educated on role and scope of OT practice  Importance of participating in therapy  Importance of OOB activities with staff assist  Pt had no sensory deficits noted in RUE. He was able to complete finger/thumb opposition with all fingers, He demonstrated mild lack of coordination when grasping objects. Pt stated he has trouble opening packets but is able to feed himself. He completed 30 reps with hand gripper on level 3 with R hand    Patient left up in chair with call button in reach and wife present    GOALS:   Multidisciplinary Problems       Occupational Therapy Goals          Problem: Occupational Therapy    Goal Priority Disciplines Outcome Interventions   Occupational Therapy Goal     OT, PT/OT Progressing    Description: STG:  Pt will perform grooming with setup  Pt will bathe with I after set up  Pt will perform UE dressing with I  Pt will perform LE dressing with I  Pt will sit EOB x 15 min with no assistance  Pt will transfer bed/chair/bsc with Mod I  Pt will perform standing task x 15 min with supervision assistance  Pt will tolerate 45 minutes of tx without fatigue      LT.Restore to max I with self care and mobility.                         DME Justifications:  No DME recommended requiring DME justifications    History:     History reviewed. No pertinent past medical history.      Past Surgical History:   Procedure Laterality Date     BACK SURGERY      KNEE SURGERY         Time Tracking:     OT Date of Treatment:    OT Start Time: 1215  OT Stop Time: 1230  OT Total Time (min): 15 min    Billable Minutes:Evaluation 15    4/27/2025

## 2025-04-28 PROBLEM — R53.81 DEBILITY: Status: ACTIVE | Noted: 2025-04-28

## 2025-04-28 PROBLEM — Z86.73 HISTORY OF CVA (CEREBROVASCULAR ACCIDENT): Status: ACTIVE | Noted: 2025-04-12

## 2025-04-28 PROBLEM — E11.9 DIABETES MELLITUS: Status: ACTIVE | Noted: 2021-09-16

## 2025-04-28 PROBLEM — I10 PRIMARY HYPERTENSION: Status: ACTIVE | Noted: 2021-09-16

## 2025-04-28 PROBLEM — N40.0 BPH (BENIGN PROSTATIC HYPERPLASIA): Status: ACTIVE | Noted: 2025-04-13

## 2025-04-28 PROBLEM — N18.30 CKD (CHRONIC KIDNEY DISEASE) STAGE 3, GFR 30-59 ML/MIN: Status: ACTIVE | Noted: 2025-04-12

## 2025-04-28 LAB
GLUCOSE SERPL-MCNC: 188 MG/DL (ref 70–105)
GLUCOSE SERPL-MCNC: 216 MG/DL (ref 70–105)
GLUCOSE SERPL-MCNC: 242 MG/DL (ref 70–105)
GLUCOSE SERPL-MCNC: 304 MG/DL (ref 70–105)

## 2025-04-28 PROCEDURE — 27000987 HC MATTRESS, MATRIX LOW PROFILE

## 2025-04-28 PROCEDURE — 63600175 PHARM REV CODE 636 W HCPCS: Performed by: NURSE PRACTITIONER

## 2025-04-28 PROCEDURE — 92610 EVALUATE SWALLOWING FUNCTION: CPT

## 2025-04-28 PROCEDURE — 25000003 PHARM REV CODE 250: Performed by: HOSPITALIST

## 2025-04-28 PROCEDURE — 97116 GAIT TRAINING THERAPY: CPT | Mod: CQ

## 2025-04-28 PROCEDURE — 27000958

## 2025-04-28 PROCEDURE — 11000004 HC SNF PRIVATE

## 2025-04-28 PROCEDURE — 82962 GLUCOSE BLOOD TEST: CPT

## 2025-04-28 PROCEDURE — 97530 THERAPEUTIC ACTIVITIES: CPT | Mod: CQ

## 2025-04-28 PROCEDURE — 92523 SPEECH SOUND LANG COMPREHEN: CPT

## 2025-04-28 PROCEDURE — 97112 NEUROMUSCULAR REEDUCATION: CPT

## 2025-04-28 PROCEDURE — 97535 SELF CARE MNGMENT TRAINING: CPT

## 2025-04-28 PROCEDURE — 25000003 PHARM REV CODE 250: Performed by: NURSE PRACTITIONER

## 2025-04-28 PROCEDURE — 99305 1ST NF CARE MODERATE MDM 35: CPT | Mod: AI,,, | Performed by: HOSPITALIST

## 2025-04-28 RX ORDER — GABAPENTIN 300 MG/1
300 CAPSULE ORAL 3 TIMES DAILY
Status: DISCONTINUED | OUTPATIENT
Start: 2025-04-28 | End: 2025-05-02 | Stop reason: HOSPADM

## 2025-04-28 RX ADMIN — ATORVASTATIN CALCIUM 40 MG: 40 TABLET, FILM COATED ORAL at 08:04

## 2025-04-28 RX ADMIN — PANTOPRAZOLE SODIUM 40 MG: 40 TABLET, DELAYED RELEASE ORAL at 08:04

## 2025-04-28 RX ADMIN — APIXABAN 5 MG: 5 TABLET, FILM COATED ORAL at 08:04

## 2025-04-28 RX ADMIN — FLUTICASONE PROPIONATE 50 MCG: 50 SPRAY, METERED NASAL at 08:04

## 2025-04-28 RX ADMIN — INSULIN ASPART 2 UNITS: 100 INJECTION, SOLUTION INTRAVENOUS; SUBCUTANEOUS at 11:04

## 2025-04-28 RX ADMIN — METHOCARBAMOL 500 MG: 500 TABLET ORAL at 08:04

## 2025-04-28 RX ADMIN — INSULIN ASPART 2 UNITS: 100 INJECTION, SOLUTION INTRAVENOUS; SUBCUTANEOUS at 08:04

## 2025-04-28 RX ADMIN — GABAPENTIN 300 MG: 300 CAPSULE ORAL at 03:04

## 2025-04-28 RX ADMIN — AMLODIPINE BESYLATE 10 MG: 10 TABLET ORAL at 08:04

## 2025-04-28 RX ADMIN — GABAPENTIN 400 MG: 400 CAPSULE ORAL at 08:04

## 2025-04-28 RX ADMIN — SENNOSIDES AND DOCUSATE SODIUM 1 TABLET: 50; 8.6 TABLET ORAL at 03:04

## 2025-04-28 RX ADMIN — GABAPENTIN 300 MG: 300 CAPSULE ORAL at 08:04

## 2025-04-28 RX ADMIN — TAMSULOSIN HYDROCHLORIDE 0.4 MG: 0.4 CAPSULE ORAL at 08:04

## 2025-04-28 RX ADMIN — INSULIN ASPART 2 UNITS: 100 INJECTION, SOLUTION INTRAVENOUS; SUBCUTANEOUS at 05:04

## 2025-04-28 NOTE — SUBJECTIVE & OBJECTIVE
History reviewed. No pertinent past medical history.    Past Surgical History:   Procedure Laterality Date    BACK SURGERY      KNEE SURGERY         Review of patient's allergies indicates:  No Known Allergies    No current facility-administered medications on file prior to encounter.     Current Outpatient Medications on File Prior to Encounter   Medication Sig    amLODIPine (NORVASC) 10 MG tablet Take 10 mg by mouth once daily.    apixaban (ELIQUIS) 5 mg Tab Take 5 mg by mouth 2 (two) times daily. Take 1 tablet by mouth in the morning and 1 tablet before bedtime.    aspirin 81 MG Chew Take 81 mg by mouth once daily. Take 1 tablet by mouth in the morning for 360 days.    atorvastatin (LIPITOR) 40 MG tablet Take 40 mg by mouth once daily. Take 1 tablet daily by mouth.    dapagliflozin propanediol (FARXIGA) 10 mg tablet Take 10 mg by mouth once daily. Take 10 mg by mouth once daily.    fluticasone propionate (FLONASE) 50 mcg/actuation nasal spray 1 spray by Each Nostril route 2 (two) times daily. Take 1 spray in each nostril in the morning and 1 spray in each nostril before bedtime.    gabapentin (NEURONTIN) 400 MG capsule Take 400 mg by mouth 3 (three) times daily. Take 1 capsule by mouth in the morning and 1 capsule at noon and 1 capsule in the evening.    insulin glargine,hum.rec.anlog (LANTUS SOLOSTAR U-100 INSULIN SUBQ) Inject 10 Units into the skin nightly. Inject 10 units under skin at bedtime.    insulin regular 100 unit/mL Inj injection Inject 8 Units into the skin 3 (three) times daily before meals. Inject 8 units under the skin in the morning, 8 units at noon, and 8 units in the evening before meals.    methocarbamoL (ROBAXIN) 750 MG Tab Take 750 mg by mouth every 6 (six) hours as needed (.). Take 1 tablet by mouth every 6 hours as needed.    omeprazole (PRILOSEC) 20 MG capsule Take 20 mg by mouth once daily. Take 1 capsule by mouth daily.    tamsulosin (FLOMAX) 0.4 mg Cap Take 0.4 mg by mouth once daily.  Take 1 capsule by mouth once daily.     Family History    None       Tobacco Use    Smoking status: Unknown    Smokeless tobacco: Former   Substance and Sexual Activity    Alcohol use: Not Currently    Drug use: Never    Sexual activity: Not Currently     Review of Systems   Constitutional:  Negative for appetite change, chills and fever.   Respiratory:  Negative for cough, shortness of breath and wheezing.    Cardiovascular:  Negative for chest pain, palpitations and leg swelling.   Gastrointestinal:  Negative for abdominal distention, diarrhea, nausea and vomiting.   Genitourinary:  Negative for dysuria.   Skin:  Negative for rash.   Neurological:  Positive for weakness. Negative for dizziness, seizures and syncope.   Psychiatric/Behavioral:  Negative for agitation, behavioral problems and confusion.    All other systems reviewed and are negative.    Objective:     Vital Signs (Most Recent):  Temp: 97.9 °F (36.6 °C) (04/28/25 0718)  Pulse: 71 (04/28/25 0718)  Resp: 20 (04/28/25 0718)  BP: (!) 167/80 (04/28/25 0718)  SpO2: 98 % (04/28/25 0718) Vital Signs (24h Range):  Temp:  [97.9 °F (36.6 °C)-98.1 °F (36.7 °C)] 97.9 °F (36.6 °C)  Pulse:  [71-76] 71  Resp:  [20] 20  SpO2:  [96 %-98 %] 98 %  BP: (156-167)/(78-80) 167/80     Weight: 79.1 kg (174 lb 7.6 oz)  Body mass index is 29.95 kg/m².     Physical Exam  Vitals reviewed.   Constitutional:       General: He is not in acute distress.     Appearance: Normal appearance.   HENT:      Head: Normocephalic and atraumatic.   Eyes:      General: No scleral icterus.     Extraocular Movements: Extraocular movements intact.      Conjunctiva/sclera: Conjunctivae normal.      Pupils: Pupils are equal, round, and reactive to light.   Cardiovascular:      Rate and Rhythm: Normal rate and regular rhythm.      Heart sounds: No murmur heard.     No friction rub. No gallop.   Pulmonary:      Effort: Pulmonary effort is normal. No respiratory distress.      Breath sounds: Normal  breath sounds. No wheezing or rales.   Abdominal:      General: Abdomen is flat. Bowel sounds are normal. There is no distension.      Palpations: Abdomen is soft.      Tenderness: There is no abdominal tenderness. There is no guarding.   Musculoskeletal:         General: No swelling.   Skin:     General: Skin is warm and dry.      Coloration: Skin is not jaundiced.      Findings: No rash.   Neurological:      Mental Status: He is alert and oriented to person, place, and time. Mental status is at baseline.      Sensory: No sensory deficit.      Motor: Weakness present.      Comments: Mild R arm weakness, improving.    Psychiatric:         Mood and Affect: Mood normal.         Thought Content: Thought content normal.         Judgment: Judgment normal.              CRANIAL NERVES     CN III, IV, VI   Pupils are equal, round, and reactive to light.       Significant Labs: All pertinent labs within the past 24 hours have been reviewed.  Recent Lab Results         04/28/25  1104   04/28/25  0603   04/27/25 2008 04/27/25  1632        POC Glucose 242   188   392   242               Significant Imaging: I have reviewed all pertinent imaging results/findings within the past 24 hours.

## 2025-04-28 NOTE — ASSESSMENT & PLAN NOTE
Patient with Acute debility due to hemiplegia/hemiparesis. The patient's latest AMPAC (Activity Measure for Post Acute Care) Score is listed below.    AM-PAC Score - How much help does the patient need for each activity listed  Basic Mobility Total Score: 18  Turning over in bed (including adjusting bedclothes, sheets and blankets)?: None  Sitting down on and standing up from a chair with arms (e.g., wheelchair, bedside commode, etc.): A little  Moving from lying on back to sitting on the side of the bed?: A little  Moving to and from a bed to a chair (including a wheelchair)?: A little  Need to walk in hospital room?: A little  Climbing 3-5 steps with a railing?: A lot    Plan  - Progressive mobility protocol initated  - PT/OT consulted  - Fall precautions in place  -

## 2025-04-28 NOTE — PLAN OF CARE
Problem: Adult Inpatient Plan of Care  Goal: Plan of Care Review  Outcome: Progressing  Goal: Patient-Specific Goal (Individualized)  Outcome: Progressing  Goal: Absence of Hospital-Acquired Illness or Injury  Outcome: Progressing  Goal: Optimal Comfort and Wellbeing  Outcome: Progressing  Goal: Readiness for Transition of Care  Outcome: Progressing     Problem: Fall Injury Risk  Goal: Absence of Fall and Fall-Related Injury  Outcome: Progressing     Problem: Stroke Rehabilitation  Goal: Optimal Adjustment to Stroke  Outcome: Progressing  Goal: Optimal Safe BADL Performance  Outcome: Progressing  Goal: Effective Bowel Elimination/Continence  Outcome: Progressing  Goal: Optimal Cognitive Function  Outcome: Progressing  Goal: Effective Communication Skills  Outcome: Progressing  Goal: Optimal Safe IADL Performance  Outcome: Progressing  Goal: Optimal Mobility [place] and Safety  Outcome: Progressing  Goal: Optimal Movement and Motor Control  Outcome: Progressing  Goal: Optimal Nutrition Intake  Outcome: Progressing  Goal: Optimal Sensory Perceptual Status  Outcome: Progressing  Goal: Maintains Intimacy/Sexual Expression  Outcome: Progressing  Goal: Effective Spasticity Management  Outcome: Progressing  Goal: Safe and Effective Swallow  Outcome: Progressing  Goal: Effective Urinary Elimination/Continence  Outcome: Progressing

## 2025-04-28 NOTE — PT/OT/SLP PROGRESS
"Occupational Therapy  Treatment    Jono Keita   MRN: [id number]   Admitting Diagnosis: <principal problem not specified>    OT Date of Treatment: 04/28/25   OT Start Time: 0900  OT Stop Time: 0929  OT Total Time (min): 29 min    Billable Minutes:  Self Care/Home Management 8 and Neuromuscular Re-education 15, THEREX 6 min               General Precautions: Standard, fall  Orthopedic Precautions:    Braces:    Respiratory Status: Room air         Subjective:  Communicated with pt and CNA prior to session.  No new complaints, pt states, "I want to try to do it myself.  I'm stubborn like that."  Pain/Comfort  Pain Rating 1: 0/10    Objective:        Functional Mobility:  Bed Mobility: pt already up in w/c upon OT arrival       Transfers: sit to stand SBA from w/c to ride UBE in standing position        Functional Ambulation: see PT    Activities of Daily Living:     Feeding adaptive equipment:  none required     UE adaptive equipment: none      LE adaptive equipment: none                    Bathing adaptive equipment: no assistive device and at this time    Balance:   Static Sit: GOOD: Takes MODERATE challenges from all directions  Dynamic Sit: GOOD: Maintains balance through MODERATE excursions of active trunk movement  Static Stand: FAIR+: Takes MINIMAL challenges from all directions  Dynamic stand: FAIR+: Needs CLOSE SUPERVISION during gait and is able to right self with minor LOB    Therapeutic Activities and Exercises:  To improve UB endurance, strength, OT facilitated pt with:  UBE x 10 min with no RB's throughout, level 3 resist in standing throughout, fatigued at end, needed to sit  Gripper x 10 reps x 6 sets each hand  1 kg ball lifting in ch press, sh flexion, and horiz abd/add 20 reps each direction    To work on neuromuscular re-education:  OT provided tactile and verbal cueing to improve balance, coordination, kinesthetic sense, posture, and proprioception in standing while increasing endurance as well for " "all functional mobility and self care skills and to reduce fall risk with tasks as noted above.      AM-PAC 6 CLICK ADL   How much help from another person does this patient currently need?   1 = Unable, Total/Dependent Assistance  2 = A lot, Maximum/Moderate Assistance  3 = A little, Minimum/Contact Guard/Supervision  4 = None, Modified [place]/Independent    Putting on and taking off regular lower body clothing? : 4  Bathing (including washing, rinsing, drying)?: 3  Toileting, which includes using toilet, bedpan, or urinal? : 3  Putting on and taking off regular upper body clothing?: 4  Taking care of personal grooming such as brushing teeth?: 4  Eating meals?: 4  Daily Activity Total Score: 22     AM-PAC Raw Score CMS "G-Code Modifier Level of Impairment Assistance   6 % Total / Unable   7 - 8 CM 80 - 100% Maximal Assist   9-13 CL 60 - 80% Moderate Assist   14 - 19 CK 40 - 60% Moderate Assist   20 - 22 CJ 20 - 40% Minimal Assist   23 CI 1-20% SBA / CGA   24 CH 0% Independent/ Mod I       Patient left up in chair with  PTA present    ASSESSMENT:  Jono Keita is a 64 y.o. male with a medical diagnosis of CVA and presents with no new problems, eager to participate, excellent motivation to progress toward PLOF.    Rehab identified problem list/impairments: some weakness of RUE and LE, mild coordination issues, fatigues more easily than normally per pt    Rehab potential is excellent.    Activity tolerance: Excellent    Discharge recommendations: Moderate Intensity Therapy   Barriers to discharge:      Equipment recommendations: none    GOALS:   Multidisciplinary Problems       Occupational Therapy Goals          Problem: Occupational Therapy    Goal Priority Disciplines Outcome Interventions   Occupational Therapy Goal     OT, PT/OT Progressing    Description: STG:  Pt will perform grooming with setup  Pt will bathe with I after set up  Pt will perform UE dressing with I  Pt will perform LE dressing with " I  Pt will sit EOB x 15 min with no assistance  Pt will transfer bed/chair/bsc with Mod I  Pt will perform standing task x 15 min with supervision assistance  Pt will tolerate 45 minutes of tx without fatigue      LT.Restore to max I with self care and mobility.                           Plan:  Patient to be seen 5 x/week to address the above listed problems via self-care/home management, therapeutic activities, therapeutic exercises, neuromuscular re-education  Plan of Care expires:    Plan of Care reviewed with: patient, spouse         2025

## 2025-04-28 NOTE — PLAN OF CARE
[facility]  Initial Discharge Assessment       Primary Care Provider: Leon Barr MD    Admission Diagnosis: CVA (cerebral vascular accident) [I63.9]    Admission Date: 4/25/2025  Expected Discharge Date:     Transition of Care Barriers: None    Payor: HU[employer] MANAGED MEDICARE / Plan: HU[employer] MEDICARE PPO / Product Type: Medicare Advantage /     Extended Emergency Contact Information  Primary Emergency Contact: DANYA GALVEZ  Address: [facility] [place] [address]           [place] [address] [facility]  Home Phone: [phone]  Mobile Phone: [phone]  Relation: Spouse  Preferred language: English   needed? No    Discharge Plan A: Home Health, Home with family         [facility] - [place], [place] - [address]  [address]  [place] [address]  Phone: [phone] Fax: [phone]      Initial Assessment (most recent)       Adult Discharge Assessment - 04/25/25 1515          Discharge Assessment    Assessment Type Discharge Planning Assessment     Confirmed/corrected address, phone number and insurance Yes     Source of Information family;patient     Communicated SIN with patient/caregiver Yes     Reason For Admission inpatient therapy     People in Home spouse     Facility Arrived From: [facility]     Do you expect to return to your current living situation? Yes     Do you have help at home or someone to help you manage your care at home? Yes     Who are your caregiver(s) and their phone number(s)? Danya [phone]     Prior to hospitilization cognitive status: Unable to Assess     Current cognitive status: Alert/Oriented     Walking or Climbing Stairs Difficulty yes     Walking or Climbing Stairs ambulation difficulty, requires equipment     Dressing/Bathing Difficulty yes     Dressing/Bathing dressing difficulty, assistance 1 person;bathing difficulty, assistance 1 person     Home Layout Able to live on 1st floor     Equipment Currently Used at Home  none     Readmission within 30 days? No     Patient currently being followed by outpatient case management? No     Do you currently have service(s) that help you manage your care at home? No     Do you take prescription medications? Yes     Do you have prescription coverage? Yes     Do you have any problems affording any of your prescribed medications? No     Is the patient taking medications as prescribed? yes     Who is going to help you get home at discharge? Anita     Are you on dialysis? No     Do you take coumadin? No     Discharge Plan A Home Health;Home with family     DME Needed Upon Discharge  other (see comments)   tbd    Discharge Plan discussed with: Patient;Spouse/sig other     Transition of Care Barriers None        Physical Activity    On average, how many days per week do you engage in moderate to strenuous exercise (like a brisk walk)? 0 days     On average, how many minutes do you engage in exercise at this level? 0 min        Financial Resource Strain    How hard is it for you to pay for the very basics like food, housing, medical care, and heating? Not hard at all        Housing Stability    In the last 12 months, was there a time when you were not able to pay the mortgage or rent on time? No     At any time in the past 12 months, were you homeless or living in a shelter (including now)? No        Transportation Needs    In the past 12 months, has lack of transportation kept you from medical appointments or from getting medications? No     In the past 12 months, has lack of transportation kept you from meetings, work, or from getting things needed for daily living? No        Food Insecurity    Within the past 12 months, you worried that your food would run out before you got the money to buy more. Never true     Within the past 12 months, the food you bought just didn't last and you didn't have money to get more. Never true        Stress    Do you feel stress - tense, restless, nervous, or  anxious, or unable to sleep at night because your mind is troubled all the time - these days? Only a little        Social Isolation    How often do you feel lonely or isolated from those around you?  Never        Alcohol Use    Q1: How often do you have a drink containing alcohol? 2-3 times a week     Q2: How many drinks containing alcohol do you have on a typical day when you are drinking? 3 or 4     Q3: How often do you have six or more drinks on one occasion? Less than monthly        Utilities    In the past 12 months has the electric, gas, oil, or water company threatened to shut off services in your home? No        Health Literacy    How often do you need to have someone help you when you read instructions, pamphlets, or other written material from your doctor or pharmacy? Sometimes                   Mr. Keita is admitted to [facility] for inpatient therapy following a hospitalization at [facility]. He is alert and oriented at time of assessment. In depth conversation occurred explaining length of stay and qualifications of swingbed. Goal for expected discharge date set and placed on white board in room. Patient and spouse verbalized understanding. Questions/concerns addressed. CM will continue to follow.

## 2025-04-28 NOTE — H&P
[facility] Medicine  History & Physical    Patient Name: Jono Keita  MRN: [id number]  Patient Class: IP- Swing  Admission Date: 4/25/2025  Attending Physician: Rico Braden DO   Primary Care Provider: Leon Barr MD         Patient information was obtained from patient, past medical records, and ER records.     Subjective:     Principal Problem:Debility    Chief Complaint: No chief complaint on file.       HPI: Mr. Keita is a 65 y/o AAM with PMH of HTN, T2DM, Stage 3 CKD, CVA, BPH, and HLD who presents from [facility] to [facility] for PT/OT/ST due to debility related to recent CVA. Patient suffered a left pontine stroke on 4/12/25. Patient states an additional previous stroke in the past.  His recent stroke resulted in residual right hemiparesis.    History reviewed. No pertinent past medical history.    Past Surgical History:   Procedure Laterality Date    BACK SURGERY      KNEE SURGERY         Review of patient's allergies indicates:  No Known Allergies    No current facility-administered medications on file prior to encounter.     Current Outpatient Medications on File Prior to Encounter   Medication Sig    amLODIPine (NORVASC) 10 MG tablet Take 10 mg by mouth once daily.    apixaban (ELIQUIS) 5 mg Tab Take 5 mg by mouth 2 (two) times daily. Take 1 tablet by mouth in the morning and 1 tablet before bedtime.    aspirin 81 MG Chew Take 81 mg by mouth once daily. Take 1 tablet by mouth in the morning for 360 days.    atorvastatin (LIPITOR) 40 MG tablet Take 40 mg by mouth once daily. Take 1 tablet daily by mouth.    dapagliflozin propanediol (FARXIGA) 10 mg tablet Take 10 mg by mouth once daily. Take 10 mg by mouth once daily.    fluticasone propionate (FLONASE) 50 mcg/actuation nasal spray 1 spray by Each Nostril route 2 (two) times daily. Take 1 spray in each nostril in the morning and 1 spray in each nostril before bedtime.    gabapentin  (NEURONTIN) 400 MG capsule Take 400 mg by mouth 3 (three) times daily. Take 1 capsule by mouth in the morning and 1 capsule at noon and 1 capsule in the evening.    insulin glargine,hum.rec.anlog (LANTUS SOLOSTAR U-100 INSULIN SUBQ) Inject 10 Units into the skin nightly. Inject 10 units under skin at bedtime.    insulin regular 100 unit/mL Inj injection Inject 8 Units into the skin 3 (three) times daily before meals. Inject 8 units under the skin in the morning, 8 units at noon, and 8 units in the evening before meals.    methocarbamoL (ROBAXIN) 750 MG Tab Take 750 mg by mouth every 6 (six) hours as needed (.). Take 1 tablet by mouth every 6 hours as needed.    omeprazole (PRILOSEC) 20 MG capsule Take 20 mg by mouth once daily. Take 1 capsule by mouth daily.    tamsulosin (FLOMAX) 0.4 mg Cap Take 0.4 mg by mouth once daily. Take 1 capsule by mouth once daily.     Family History    None       Tobacco Use    Smoking status: Unknown    Smokeless tobacco: Former   Substance and Sexual Activity    Alcohol use: Not Currently    Drug use: Never    Sexual activity: Not Currently     Review of Systems   Constitutional:  Negative for appetite change, chills and fever.   Respiratory:  Negative for cough, shortness of breath and wheezing.    Cardiovascular:  Negative for chest pain, palpitations and leg swelling.   Gastrointestinal:  Negative for abdominal distention, diarrhea, nausea and vomiting.   Genitourinary:  Negative for dysuria.   Skin:  Negative for rash.   Neurological:  Positive for weakness. Negative for dizziness, seizures and syncope.   Psychiatric/Behavioral:  Negative for agitation, behavioral problems and confusion.    All other systems reviewed and are negative.    Objective:     Vital Signs (Most Recent):  Temp: 97.9 °F (36.6 °C) (04/28/25 0718)  Pulse: 71 (04/28/25 0718)  Resp: 20 (04/28/25 0718)  BP: (!) 167/80 (04/28/25 0718)  SpO2: 98 % (04/28/25 0718) Vital Signs (24h Range):  Temp:  [97.9 °F (36.6  °C)-98.1 °F (36.7 °C)] 97.9 °F (36.6 °C)  Pulse:  [71-76] 71  Resp:  [20] 20  SpO2:  [96 %-98 %] 98 %  BP: (156-167)/(78-80) 167/80     Weight: 79.1 kg (174 lb 7.6 oz)  Body mass index is 29.95 kg/m².     Physical Exam  Vitals reviewed.   Constitutional:       General: He is not in acute distress.     Appearance: Normal appearance.   HENT:      Head: Normocephalic and atraumatic.   Eyes:      General: No scleral icterus.     Extraocular Movements: Extraocular movements intact.      Conjunctiva/sclera: Conjunctivae normal.      Pupils: Pupils are equal, round, and reactive to light.   Cardiovascular:      Rate and Rhythm: Normal rate and regular rhythm.      Heart sounds: No murmur heard.     No friction rub. No gallop.   Pulmonary:      Effort: Pulmonary effort is normal. No respiratory distress.      Breath sounds: Normal breath sounds. No wheezing or rales.   Abdominal:      General: Abdomen is flat. Bowel sounds are normal. There is no distension.      Palpations: Abdomen is soft.      Tenderness: There is no abdominal tenderness. There is no guarding.   Musculoskeletal:         General: No swelling.   Skin:     General: Skin is warm and dry.      Coloration: Skin is not jaundiced.      Findings: No rash.   Neurological:      Mental Status: He is alert and oriented to person, place, and time. Mental status is at baseline.      Sensory: No sensory deficit.      Motor: Weakness present.      Comments: Mild R arm weakness, improving.    Psychiatric:         Mood and Affect: Mood normal.         Thought Content: Thought content normal.         Judgment: Judgment normal.              CRANIAL NERVES     CN III, IV, VI   Pupils are equal, round, and reactive to light.       Significant Labs: All pertinent labs within the past 24 hours have been reviewed.  Recent Lab Results         04/28/25  1104   04/28/25  0603   04/27/25 2008 04/27/25  1632        POC Glucose 242   188   392   242               Significant  "Imaging: I have reviewed all pertinent imaging results/findings within the past 24 hours.  Assessment/Plan:     Assessment & Plan  At risk for falls      At risk for venous thromboembolism (VTE)  - Maintain skin and mucous membrane integrity  - Promote hand, oral, and pulmonary hygiene  - Optimize fluid balance, nutrition, sleep, glycemic control to maximize infection resistance  - Identify patient sources of infection early to prevent or mitigate progression of infection      BPH (benign prostatic hyperplasia)  Resume meds.     CKD (chronic kidney disease) stage 3, GFR 30-59 ml/min  Creatine stable for now. BMP reviewed- noted Estimated Creatinine Clearance: 41.2 mL/min (A) (based on SCr of 1.7 mg/dL (H)). according to latest data. Based on current GFR, CKD stage is stage 3 - GFR 30-59.  Monitor UOP and serial BMP and adjust therapy as needed. Renally dose meds. Avoid nephrotoxic medications and procedures.  Diabetes mellitus  Patient's FSGs are uncontrolled due to hyperglycemia on current medication regimen.  Last A1c reviewed-   Lab Results   Component Value Date    HGBA1C 9.3 (H) 04/13/2025     Most recent fingerstick glucose reviewed- No results for input(s): "POCTGLUCOSE" in the last 24 hours.  Current correctional scale  Low  Maintain anti-hyperglycemic dose as follows-   Antihyperglycemics (From admission, onward)      Start     Stop Route Frequency Ordered    04/25/25 1700  insulin aspart U-100 injection 0-5 Units         -- SubQ Before meals & nightly PRN 04/25/25 1500          Hold Oral hypoglycemics while patient is in the hospital.  History of CVA (cerebrovascular accident)  Here for PT/OT    Primary hypertension  Patient's blood pressure range in the last 24 hours was: BP  Min: 156/78  Max: 167/80.The patient's inpatient anti-hypertensive regimen is listed below:  Current Antihypertensives  amLODIPine tablet 10 mg, Daily, Oral  , Daily, Oral    Plan  - BP is uncontrolled, will adjust as follows: maybe " add extra diuertic   -   Debility  Patient with Acute debility due to hemiplegia/hemiparesis. The patient's latest AMPAC (Activity Measure for Post Acute Care) Score is listed below.    AM-PAC Score - How much help does the patient need for each activity listed  Basic Mobility Total Score: 18  Turning over in bed (including adjusting bedclothes, sheets and blankets)?: None  Sitting down on and standing up from a chair with arms (e.g., wheelchair, bedside commode, etc.): A little  Moving from lying on back to sitting on the side of the bed?: A little  Moving to and from a bed to a chair (including a wheelchair)?: A little  Need to walk in hospital room?: A little  Climbing 3-5 steps with a railing?: A lot    Plan  - Progressive mobility protocol initated  - PT/OT consulted  - Fall precautions in place  -         VTE Risk Mitigation (From admission, onward)           Ordered     apixaban tablet 5 mg  2 times daily         04/25/25 1500     IP VTE LOW RISK PATIENT  Once         04/25/25 1459                               [facility]  Hospital Medicine  Progress Note    Patient Name: Jono Keita  MRN: [id number]  Patient Class: IP- Swing   Admission Date: 4/25/2025  Length of Stay: 0 days  Attending Physician: Rico Braden DO  Primary Care Provider: Leon Barr MD        Subjective    Principal Problem:<principal problem not specified>        HPI:  Mr. Keita is a 65 y/o AAM with PMH of HTN, T2DM, Stage 3 CKD, CVA, BPH, and HLD who presents from [facility] to [facility] for PT/OT/ST due to debility related to recent CVA. Patient suffered a left pontine stroke on 4/12/25. Patient states an additional previous stroke in the past.  His recent stroke resulted in residual right hemiparesis.    Overview/Hospital Course:  4/25 Patient sitting up in WC talking on phone in NAD with wife at bedside. Patient is pleasant an answers questions appropriately. He and his wife  express understanding of plan of treatment and agree with plan.    Review of Systems   Constitutional:  Negative for chills and fever.   Eyes: Negative.    Respiratory: Negative.  Negative for apnea, cough, choking, chest tightness, shortness of breath, wheezing and stridor.    Cardiovascular: Negative.  Negative for chest pain, palpitations and leg swelling.   Gastrointestinal: Negative.    Genitourinary: Negative.    Musculoskeletal: Negative.    Skin: Negative.    Neurological:  Positive for weakness.   Psychiatric/Behavioral: Negative.     All other systems reviewed and are negative.    Objective:     Vital Signs (Most Recent):  Temp: 97.8 °F (36.6 °C) (04/25/25 1627)  Pulse: 75 (04/25/25 1627)  Resp: (!) 22 (04/25/25 1627)  BP: 134/88 (04/25/25 1627)  SpO2: 97 % (04/25/25 1627) Vital Signs (24h Range):  Temp:  [97.8 °F (36.6 °C)] 97.8 °F (36.6 °C)  Pulse:  [75] 75  Resp:  [22] 22  SpO2:  [97 %] 97 %  BP: (134)/(88) 134/88     Weight: 79.2 kg (174 lb 8 oz)  Body mass index is 29.95 kg/m².    Intake/Output Summary (Last 24 hours) at 4/25/2025 1741  Last data filed at 4/25/2025 1553  Gross per 24 hour   Intake --   Output 100 ml   Net -100 ml         Physical Exam  Vitals and nursing note reviewed.   Constitutional:       General: He is not in acute distress.     Appearance: Normal appearance. He is normal weight. He is not ill-appearing.   HENT:      Head: Normocephalic.      Mouth/Throat:      Mouth: Mucous membranes are moist.   Eyes:      General: No scleral icterus.     Extraocular Movements: Extraocular movements intact.      Conjunctiva/sclera: Conjunctivae normal.      Pupils: Pupils are equal, round, and reactive to light.   Cardiovascular:      Rate and Rhythm: Normal rate and regular rhythm.      Pulses: Normal pulses.      Heart sounds: Normal heart sounds. No murmur heard.     No friction rub. No gallop.   Pulmonary:      Effort: Pulmonary effort is normal.      Breath sounds: Normal breath sounds. No  "wheezing, rhonchi or rales.   Abdominal:      General: Abdomen is flat. Bowel sounds are normal. There is no distension.      Tenderness: There is no abdominal tenderness.   Musculoskeletal:         General: No tenderness. Normal range of motion.      Cervical back: Normal range of motion and neck supple. No rigidity.   Skin:     General: Skin is warm and dry.      Capillary Refill: Capillary refill takes less than 2 seconds.   Neurological:      Mental Status: He is alert.      GCS: GCS eye subscore is 4. GCS verbal subscore is 5. GCS motor subscore is 6.      Motor: Weakness present.      Comments: Right side weakness upper and lower extremity   Psychiatric:         Mood and Affect: Mood normal.         Behavior: Behavior normal.         Thought Content: Thought content normal.         Judgment: Judgment normal.               Significant Labs: All pertinent labs within the past 24 hours have been reviewed.  POCT Glucose: No results for input(s): "POCTGLUCOSE" in the last 48 hours.    Significant Imaging: I have reviewed all pertinent imaging results/findings within the past 24 hours.      Assessment & Plan  At risk for falls      At risk for venous thromboembolism (VTE)  - Maintain skin and mucous membrane integrity  - Promote hand, oral, and pulmonary hygiene  - Optimize fluid balance, nutrition, sleep, glycemic control to maximize infection resistance  - Identify patient sources of infection early to prevent or mitigate progression of infection        VTE Risk Mitigation (From admission, onward)           Ordered     apixaban tablet 5 mg  2 times daily         04/25/25 1500     IP VTE LOW RISK PATIENT  Once         04/25/25 1459                    Discharge Planning   SIN:      Code Status: Full Code   Medical Readiness for Discharge Date:                    Please place Justification for DME        JENNIFER Calvo  Department of Hospital Medicine   [facility]      Rico" DO Sampson  Department of Hospital Medicine  [facility]

## 2025-04-28 NOTE — ASSESSMENT & PLAN NOTE
Creatine stable for now. BMP reviewed- noted Estimated Creatinine Clearance: 41.2 mL/min (A) (based on SCr of 1.7 mg/dL (H)). according to latest data. Based on current GFR, CKD stage is stage 3 - GFR 30-59.  Monitor UOP and serial BMP and adjust therapy as needed. Renally dose meds. Avoid nephrotoxic medications and procedures.

## 2025-04-28 NOTE — PLAN OF CARE
Problem: Adult Inpatient Plan of Care  Goal: Plan of Care Review  Outcome: Progressing  Goal: Patient-Specific Goal (Individualized)  Outcome: Progressing  Goal: Optimal Comfort and Wellbeing  Outcome: Progressing     Problem: Fall Injury Risk  Goal: Absence of Fall and Fall-Related Injury  Outcome: Progressing

## 2025-04-28 NOTE — ASSESSMENT & PLAN NOTE
"Patient's FSGs are uncontrolled due to hyperglycemia on current medication regimen.  Last A1c reviewed-   Lab Results   Component Value Date    HGBA1C 9.3 (H) 04/13/2025     Most recent fingerstick glucose reviewed- No results for input(s): "POCTGLUCOSE" in the last 24 hours.  Current correctional scale  Low  Maintain anti-hyperglycemic dose as follows-   Antihyperglycemics (From admission, onward)      Start     Stop Route Frequency Ordered    04/25/25 1700  insulin aspart U-100 injection 0-5 Units         -- SubQ Before meals & nightly PRN 04/25/25 1500          Hold Oral hypoglycemics while patient is in the hospital.  "

## 2025-04-28 NOTE — PT/OT/SLP PROGRESS
Physical Therapy Treatment    Patient Name:  Jono Keita   MRN:  [id number]    Recommendations:     Discharge Recommendations: Moderate Intensity Therapy  Discharge Equipment Recommendations: walker, rolling  Barriers to discharge: None    Assessment:     Jono Keita is a 64 y.o. male admitted with a medical diagnosis of <principal problem not specified>.  He presents with the following impairments/functional limitations: weakness, impaired endurance, impaired functional mobility, gait instability, impaired balance pt was able to perform 5 sit to stand transfers in 46 seconds with SBA.    Rehab Prognosis: Good; patient would benefit from acute skilled PT services to address these deficits and reach maximum level of function.    Recent Surgery: * No surgery found *      Plan:     During this hospitalization, patient to be seen 5 x/week to address the identified rehab impairments via gait training, therapeutic activities, therapeutic exercises, neuromuscular re-education, wheelchair management/training and progress toward the following goals:    Plan of Care Expires:  05/23/25    Subjective     Chief Complaint: none  Patient/Family Comments/goals: return to home  Pain/Comfort:  Pain Rating 1: 0/10  Pain Rating Post-Intervention 1: 0/10      Objective:     Communicated with OT prior to session.  Patient found up in chair with chair check upon PT entry to room.     General Precautions: Standard, fall  Orthopedic Precautions: N/A  Braces: N/A  Respiratory Status: Room air     Functional Mobility:  Transfers:     Sit to Stand:  stand by assistance with rolling walker  Gait: Pt was able to ambulate 100 ft with CGA and a RW      AM-PAC 6 CLICK MOBILITY  Turning over in bed (including adjusting bedclothes, sheets and blankets)?: 4  Sitting down on and standing up from a chair with arms (e.g., wheelchair, bedside commode, etc.): 3  Moving from lying on back to sitting on the side of the bed?: 3  Moving to and from a bed to a  chair (including a wheelchair)?: 3  Need to walk in hospital room?: 3  Climbing 3-5 steps with a railing?: 2  Basic Mobility Total Score: 18       Treatment & Education:    Transfer and Gait listed above  Nu step 6 min, seated laqs, seated marches, dynamic standing with reaching    Patient left up in chair with call button in reach and chair alarm on..    GOALS:   Multidisciplinary Problems       Physical Therapy Goals          Problem: Physical Therapy    Goal Priority Disciplines Outcome Interventions   Physical Therapy Goal     PT, PT/OT     Description: Short Term Goals (1 Week)  Patient will be modified independent with sit to stand with use of RW.     Long Term Goals (4 Weeks)  Patient will need CGA with gait with straight cane  x 350 feet on level surfaces.  Patient will be independent with commode transfer.  Patient will need SBA with negotiating 4 steps with rail .                        DME Justifications:   Jono's mobility limitation cannot be sufficiently resolved by the use of a cane. His functional mobility deficit can be sufficiently resolved with the use of a Rolling Walker. Patient's mobility limitation significantly impairs their ability to participate in one of more activities of daily living.  The use of a RW will significantly improve the patient's ability to participate in MRADLS and the patient will use it on regular basis in the home.    Time Tracking:     PT Received On: 04/28/25  PT Start Time: 0930     PT Stop Time: 0955  PT Total Time (min): 25 min     Billable Minutes: Gait Training 10 and Therapeutic Activity 15    Treatment Type: Treatment  PT/PTA: PTA     Number of PTA visits since last PT visit: 1 04/28/2025

## 2025-04-29 LAB
GLUCOSE SERPL-MCNC: 198 MG/DL (ref 70–105)
GLUCOSE SERPL-MCNC: 251 MG/DL (ref 70–105)
GLUCOSE SERPL-MCNC: 251 MG/DL (ref 70–105)
GLUCOSE SERPL-MCNC: 339 MG/DL (ref 70–105)

## 2025-04-29 PROCEDURE — 27000958

## 2025-04-29 PROCEDURE — 27000987 HC MATTRESS, MATRIX LOW PROFILE

## 2025-04-29 PROCEDURE — 11000004 HC SNF PRIVATE

## 2025-04-29 PROCEDURE — 25000003 PHARM REV CODE 250: Performed by: NURSE PRACTITIONER

## 2025-04-29 PROCEDURE — 97116 GAIT TRAINING THERAPY: CPT | Mod: CQ

## 2025-04-29 PROCEDURE — 82962 GLUCOSE BLOOD TEST: CPT

## 2025-04-29 PROCEDURE — 25000003 PHARM REV CODE 250: Performed by: HOSPITALIST

## 2025-04-29 PROCEDURE — 97530 THERAPEUTIC ACTIVITIES: CPT | Mod: CQ

## 2025-04-29 PROCEDURE — 92507 TX SP LANG VOICE COMM INDIV: CPT

## 2025-04-29 PROCEDURE — 63600175 PHARM REV CODE 636 W HCPCS: Performed by: NURSE PRACTITIONER

## 2025-04-29 PROCEDURE — 97530 THERAPEUTIC ACTIVITIES: CPT

## 2025-04-29 PROCEDURE — 97110 THERAPEUTIC EXERCISES: CPT

## 2025-04-29 RX ADMIN — GABAPENTIN 300 MG: 300 CAPSULE ORAL at 02:04

## 2025-04-29 RX ADMIN — FLUTICASONE PROPIONATE 50 MCG: 50 SPRAY, METERED NASAL at 08:04

## 2025-04-29 RX ADMIN — INSULIN ASPART 3 UNITS: 100 INJECTION, SOLUTION INTRAVENOUS; SUBCUTANEOUS at 11:04

## 2025-04-29 RX ADMIN — FLUTICASONE PROPIONATE 50 MCG: 50 SPRAY, METERED NASAL at 09:04

## 2025-04-29 RX ADMIN — INSULIN ASPART 4 UNITS: 100 INJECTION, SOLUTION INTRAVENOUS; SUBCUTANEOUS at 04:04

## 2025-04-29 RX ADMIN — APIXABAN 5 MG: 5 TABLET, FILM COATED ORAL at 08:04

## 2025-04-29 RX ADMIN — GABAPENTIN 300 MG: 300 CAPSULE ORAL at 09:04

## 2025-04-29 RX ADMIN — TAMSULOSIN HYDROCHLORIDE 0.4 MG: 0.4 CAPSULE ORAL at 08:04

## 2025-04-29 RX ADMIN — INSULIN ASPART 1 UNITS: 100 INJECTION, SOLUTION INTRAVENOUS; SUBCUTANEOUS at 09:04

## 2025-04-29 RX ADMIN — GABAPENTIN 300 MG: 300 CAPSULE ORAL at 08:04

## 2025-04-29 RX ADMIN — PANTOPRAZOLE SODIUM 40 MG: 40 TABLET, DELAYED RELEASE ORAL at 08:04

## 2025-04-29 RX ADMIN — APIXABAN 5 MG: 5 TABLET, FILM COATED ORAL at 09:04

## 2025-04-29 RX ADMIN — AMLODIPINE BESYLATE 10 MG: 10 TABLET ORAL at 08:04

## 2025-04-29 RX ADMIN — ATORVASTATIN CALCIUM 40 MG: 40 TABLET, FILM COATED ORAL at 08:04

## 2025-04-29 RX ADMIN — METHOCARBAMOL 500 MG: 500 TABLET ORAL at 09:04

## 2025-04-29 NOTE — PLAN OF CARE
"  Problem: SLP  Goal: SLP Goal  Description: Patient will complete lingual protrusions, elevations, depressions, and lats with mod-max accuracy Independently.   Patient will complete labial protrusions and retractions with mod-max accuracy Independently.   Patient will utilize dysarthria techniques with 80% accuracy Independently.   Patient will answer "wh" questions with 80% accuracy Independently.   Patient will complete divergent naming tasks with 80% accuracy Independently.   Patient will complete convergent naming tasks with 80% accuracy Independently.   Patient will complete problem solving tasks with 80% accuracy Independently.   Patient will complete verbal reasoning tasks with 80% accuracy Independently.        Outcome: Progressing     "

## 2025-04-29 NOTE — PLAN OF CARE
Problem: Diabetes Comorbidity  Goal: Blood Glucose Level Within Targeted Range  Outcome: Progressing     Problem: Adult Inpatient Plan of Care  Goal: Plan of Care Review  Outcome: Progressing     Problem: Adult Inpatient Plan of Care  Goal: Patient-Specific Goal (Individualized)  Outcome: Progressing

## 2025-04-29 NOTE — PT/OT/SLP PROGRESS
"Speech Language Pathology Treatment    Patient Name:  Jono Keita   MRN:  [id number]  Admitting Diagnosis: Debility    Recommendations:                 General Recommendations:  Speech/language therapy and Cognitive-linguistic therapy  Diet recommendations:  Regular, Liquid Diet Level: Thin   Aspiration Precautions: 1 bite/sip at a time and Small bites/sips   General Precautions: Standard, aspiration  Communication strategies:  provide increased time to answer    Assessment:     Jono Keita is a 64 y.o. 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 Pt with difficulty in word finding , slightly slurred speech and difficulty with diadochokinesis.    Subjective     Pt alert, upright in wheelchair agree to ST services in therapy room.  Patient goals: talk faster and better word finding     Pain/Comfort:       Respiratory Status: Room air    Objective:     Has the patient been evaluated by SLP for swallowing?      Keep patient NPO?     Current Respiratory Status:        Pt completed task for lingual protrusion/lateralization, elevation/depression x7 sets of 10 with ST model and mirror for biofeedback.  Pt completed tasks for labial retraction/protrusion, buccal elevation across 6 sets of 10 with mirror and ST model.   Pt completed task for answering 'wh' questions x20 with 85% accuracy, mild delay.  Pt completed task for simple reasoning and problem solving with 74% accuracy.     Goals:   Multidisciplinary Problems       SLP Goals          Problem: SLP    Goal Priority Disciplines Outcome   SLP Goal     SLP Progressing   Description: Patient will complete lingual protrusions, elevations, depressions, and lats with mod-max accuracy Independently.   Patient will complete labial protrusions and retractions with mod-max accuracy Independently.   Patient will utilize dysarthria techniques with 80% accuracy Independently.   Patient will answer "wh" questions " with 80% accuracy Independently.   Patient will complete divergent naming tasks with 80% accuracy Independently.   Patient will complete convergent naming tasks with 80% accuracy Independently.   Patient will complete problem solving tasks with 80% accuracy Independently.   Patient will complete verbal reasoning tasks with 80% accuracy Independently.                             Plan:     Patient to be seen:  3 x/week   Plan of Care expires:  05/26/25  Plan of Care reviewed with:  patient   SLP Follow-Up:  Yes       Discharge recommendations:  Moderate Intensity Therapy   Barriers to Discharge:  Level of Skilled Assistance Needed currently    Time Tracking:     SLP Treatment Date:   04/29/25  Speech Start Time:  0900  Speech Stop Time:  0932     Speech Total Time (min):  32 min    Billable Minutes: Speech Therapy Individual 32    04/29/2025

## 2025-04-29 NOTE — PT/OT/SLP EVAL
Speech Language Pathology Evaluation  Cognitive/Bedside Swallow    Patient Name:  Jono Keita   MRN:  [id number]  Admitting Diagnosis: Debility    Recommendations:                  General Recommendations:  Speech/language therapy  Diet recommendations:  Regular, Thin   Aspiration Precautions: 1 bite/sip at a time, Alternating bites/sips, HOB to 90 degrees, and Monitor for s/s of aspiration   General Precautions: Standard, aspiration  Communication strategies:   Pt may require cues to decrease rate of speech and/or repeat information    Assessment:     Jono Keita is a 64 y.o. male with an SLP diagnosis of  CVA .  He presents with mild-moderate dysarthria and expressive language deficits.  Pt exhibits no overt s/s of aspiration with thin liquid, solid or pudding consistencies. Rec regular diet with thin liquids.  ST to treat daily 3x/wk for dysarthria therapeutic techniques, language stimulation/facilitation, pt/family education, all as indicated.     History:     History reviewed. No pertinent past medical history.    Past Surgical History:   Procedure Laterality Date    BACK SURGERY      KNEE SURGERY         Social History: Patient lives with wife.    Prior Intubation HX:  na    Modified Barium Swallow: na    Chest X-Rays: see chart    Prior diet: regular.    Occupation/hobbies/homemaking: fishing and hunting.    Subjective     Pt lying in bed; agreeable to BSE.  SLP spoke to nurse prior to evaluation.  Patient goals: Get able to speak better and learn to walk; get his strength back     Pain/Comfort:  Pain Rating 1: 0/10    Respiratory Status: Room air    Objective:     Cognitive Status:    Pt oriented to person, place, and time.  Pt exhibits mild-moderate deficits with problem solving and reasoning.        Receptive Language:   Comprehension:      Pt completes multi-step commands and answers yes/no questions.    Pragmatics:    WFL    Expressive Language:  Verbal:    Pt completes automatic speech tasks; pt exhibits  "mild-moderate deficits with verbal expression in conversation. Pt reports difficulty finding words and expressing thoughts.           Motor Speech:  Dysarthria Pt exhibits mild-moderate dysarthria  Articulation Pts speech is characterized by imprecise consonnants and distortions. Pt is missing front four teeth which contributes to distortions. Pt and wife report change in speech since recent onset.    Voice:   WFL    Oral Musculature Evaluation  Oral Musculature: general weakness  Dentition: scattered dentition, teeth in poor condition (Patient missing front four teeth)  Secretion Management: adequate (Minimal oral leakage on right)  Mucosal Quality: adequate  Mandibular Strength and Mobility: WFL  Oral Labial Strength and Mobility:  (Mild labial droop on right)  Lingual Strength and Mobility: impaired strength (General weakness)  Velar Elevation: WFL  Buccal Strength and Mobility: [facility]  Oral Musculature Comments: Pt presents with general weakness.    Bedside Swallow Eval:   Consistencies Assessed:  Thin liquids No overt s/s of aspiration  Puree No overt s/s of aspiration  Solids no Overt s/s of aspiration      Oral Phase:   WFL    Pharyngeal Phase:   no overt clinical signs/symptoms of aspiration  no overt clinical signs/symptoms of pharyngeal dysphagia    Compensatory Strategies  None    Treatment: ST to treat daily 3x/wk for dysarthria therapeutic techniques,language stimulation/facilitation, pt family ed    Goals:   Multidisciplinary Problems       SLP Goals          Problem: SLP    Goal Priority Disciplines Outcome   SLP Goal     SLP Progressing   Description: Patient will complete lingual protrusions, elevations, depressions, and lats with mod-max accuracy Independently.   Patient will complete labial protrusions and retractions with mod-max accuracy Independently.   Patient will utilize dysarthria techniques with 80% accuracy Independently.   Patient will answer "wh" questions with 80% accuracy Independently. "   Patient will complete divergent naming tasks with 80% accuracy Independently.   Patient will complete convergent naming tasks with 80% accuracy Independently.   Patient will complete problem solving tasks with 80% accuracy Independently.   Patient will complete verbal reasoning tasks with 80% accuracy Independently.                             Plan:     Patient to be seen:  3 x/week   Plan of Care expires:  05/26/25  Plan of Care reviewed with:  patient   SLP Follow-Up:  Yes       Discharge recommendations:  Therapy Intensity Recommendations at Discharge: Moderate Intensity Therapy   Barriers to Discharge:  Level of Skilled Assistance Needed pt may require increased assistance at discharge    Time Tracking:     SLP Treatment Date:   04/28/25  Speech Start Time:  1349  Speech Stop Time:  1417     Speech Total Time (min):  28 min    Billable Minutes: Eval 18  and Eval Swallow and Oral Function 10    04/29/2025

## 2025-04-29 NOTE — PT/OT/SLP PROGRESS
Physical Therapy Treatment    Patient Name:  Jono Keita   MRN:  [id number]    Recommendations:     Discharge Recommendations: Moderate Intensity Therapy  Discharge Equipment Recommendations: walker, rolling  Barriers to discharge: None    Assessment:     Jono Keita is a 64 y.o. male admitted with a medical diagnosis of Debility.  He presents with the following impairments/functional limitations: weakness, impaired endurance, impaired functional mobility, gait instability Pt was able to ambulate 300 ft with CGA and a RW.    Rehab Prognosis: Fair; patient would benefit from acute skilled PT services to address these deficits and reach maximum level of function.    Recent Surgery: * No surgery found *      Plan:     During this hospitalization, patient to be seen 5 x/week to address the identified rehab impairments via gait training, therapeutic activities, therapeutic exercises, neuromuscular re-education, wheelchair management/training and progress toward the following goals:    Plan of Care Expires:  05/23/25    Subjective     Chief Complaint: none  Patient/Family Comments/goals: return to home  Pain/Comfort:  Pain Rating 1: 0/10  Pain Rating Post-Intervention 1: 0/10      Objective:     Communicated with SLP prior to session.  Patient found up in chair with chair check upon PT entry to room.     General Precautions: Standard, aspiration  Orthopedic Precautions: N/A  Braces: N/A  Respiratory Status: Room air     Functional Mobility:  Transfers:     Sit to Stand:  stand by assistance with rolling walker  Gait: Pt was able to ambulate 300 ft with CGA and a RW      AM-PAC 6 CLICK MOBILITY  Turning over in bed (including adjusting bedclothes, sheets and blankets)?: 4  Sitting down on and standing up from a chair with arms (e.g., wheelchair, bedside commode, etc.): 3  Moving from lying on back to sitting on the side of the bed?: 3  Moving to and from a bed to a chair (including a wheelchair)?: 3  Need to walk in hospital  room?: 3  Climbing 3-5 steps with a railing?: 2  Basic Mobility Total Score: 18       Treatment & Education:    Transfer and Gait listed above  Nu step x 6 min, standing marches, standing heel raises, sit to stand x 5, dynamic standing with throwing    Patient left up in chair with call button in reach and chair alarm on..    GOALS:   Multidisciplinary Problems       Physical Therapy Goals          Problem: Physical Therapy    Goal Priority Disciplines Outcome Interventions   Physical Therapy Goal     PT, PT/OT     Description: Short Term Goals (1 Week)  Patient will be modified independent with sit to stand with use of RW.     Long Term Goals (4 Weeks)  Patient will need CGA with gait with straight cane  x 350 feet on level surfaces.  Patient will be independent with commode transfer.  Patient will need SBA with negotiating 4 steps with rail .                        DME Justifications:   Jono's mobility limitation cannot be sufficiently resolved by the use of a cane. His functional mobility deficit can be sufficiently resolved with the use of a Rolling Walker. Patient's mobility limitation significantly impairs their ability to participate in one of more activities of daily living.  The use of a RW will significantly improve the patient's ability to participate in MRADLS and the patient will use it on regular basis in the home.    Time Tracking:     PT Received On: 04/29/25  PT Start Time: 0935     PT Stop Time: 1000  PT Total Time (min): 25 min     Billable Minutes: Gait Training 10 and Therapeutic Activity 15    Treatment Type: Treatment  PT/PTA: PTA     Number of PTA visits since last PT visit: 2     04/29/2025

## 2025-04-29 NOTE — PT/OT/SLP PROGRESS
Occupational Therapy   Treatment    Name: Jono Keita  MRN: [id number]  Admitting Diagnosis:  Debility       Recommendations:     Discharge Recommendations: Moderate Intensity Therapy  Discharge Equipment Recommendations:  none  Barriers to discharge:       Assessment:     Jono Keita is a 64 y.o. male with a medical diagnosis of Debility.  He presents with no new complaints, eager to work toward getting better. Performance deficits affecting function are weakness, impaired endurance, impaired self care skills, impaired functional mobility, gait instability, impaired balance, decreased coordination.     Rehab Prognosis:  Good; patient would benefit from acute skilled OT services to address these deficits and reach maximum level of function.       Plan:     Patient to be seen 5 x/week to address the above listed problems via self-care/home management, therapeutic activities, therapeutic exercises, neuromuscular re-education  Plan of Care Expires:    Plan of Care Reviewed with: patient, spouse    Subjective     Chief Complaint: weakness, especially right sided   Patient/Family Comments/goals: home with wife  Pain/Comfort:       Objective:     Communicated with: pt and nursing prior to session.  Patient found up in chair with   upon OT entry to room.    General Precautions: Standard, fall    Orthopedic Precautions:   Braces:    Respiratory Status: Room air     Occupational Performance:     Bed Mobility:    Na with OT today    Functional Mobility/Transfers:  Patient completed Sit <> Stand Transfer with stand by assistance  with  hand-held assist and grab bars(s)   Functional Mobility: see PT    Activities of Daily Living:  Na with OT      [facility] 6 Click ADL:      Treatment & Education:  To improve UB endurance, strength, OT facilitated pt with:  UBE x 8 min with no RB's throughout, level 4 resist    To improve reach, AROM, FM/ and trunk rotation with core forward leaning engagement:   OT initiated pt with vertical card  board matching activity, pt eager to perform and participate in this activity today, 1# wrist weights      Patient left up in chair with call button in reach and family present    GOALS:   Multidisciplinary Problems       Occupational Therapy Goals          Problem: Occupational Therapy    Goal Priority Disciplines Outcome Interventions   Occupational Therapy Goal     OT, PT/OT Progressing    Description: STG:  Pt will perform grooming with setup  Pt will bathe with I after set up  Pt will perform UE dressing with I  Pt will perform LE dressing with I  Pt will sit EOB x 15 min with no assistance  Pt will transfer bed/chair/bsc with Mod I  Pt will perform standing task x 15 min with supervision assistance  Pt will tolerate 45 minutes of tx without fatigue      LT.Restore to max I with self care and mobility.                         \    Time Tracking:     OT Date of Treatment: 25  OT Start Time: 1509  OT Stop Time: 1539  OT Total Time (min): 30 min    Billable Minutes:Therapeutic Activity 20  Therapeutic Exercise 10               2025

## 2025-04-29 NOTE — PLAN OF CARE
Problem: Adult Inpatient Plan of Care  Goal: Plan of Care Review  Outcome: Progressing  Goal: Patient-Specific Goal (Individualized)  Outcome: Progressing  Goal: Absence of Hospital-Acquired Illness or Injury  Outcome: Progressing  Goal: Optimal Comfort and Wellbeing  Outcome: Progressing  Goal: Readiness for Transition of Care  Outcome: Progressing     Problem: Fall Injury Risk  Goal: Absence of Fall and Fall-Related Injury  Outcome: Progressing     Problem: Stroke Rehabilitation  Goal: Optimal Adjustment to Stroke  Outcome: Progressing  Goal: Optimal Safe BADL Performance  Outcome: Progressing  Goal: Effective Bowel Elimination/Continence  Outcome: Progressing  Goal: Optimal Cognitive Function  Outcome: Progressing  Goal: Effective Communication Skills  Outcome: Progressing  Goal: Optimal Safe IADL Performance  Outcome: Progressing  Goal: Optimal Mobility [place] and Safety  Outcome: Progressing  Goal: Optimal Movement and Motor Control  Outcome: Progressing  Goal: Optimal Nutrition Intake  Outcome: Progressing  Goal: Optimal Sensory Perceptual Status  Outcome: Progressing  Goal: Maintains Intimacy/Sexual Expression  Outcome: Progressing  Goal: Effective Spasticity Management  Outcome: Progressing  Goal: Safe and Effective Swallow  Outcome: Progressing  Goal: Effective Urinary Elimination/Continence  Outcome: Progressing     Problem: Diabetes Comorbidity  Goal: Blood Glucose Level Within Targeted Range  Outcome: Progressing

## 2025-04-30 LAB
GLUCOSE SERPL-MCNC: 205 MG/DL (ref 70–105)
GLUCOSE SERPL-MCNC: 267 MG/DL (ref 70–105)
GLUCOSE SERPL-MCNC: 354 MG/DL (ref 70–105)
GLUCOSE SERPL-MCNC: 384 MG/DL (ref 70–105)

## 2025-04-30 PROCEDURE — 63600175 PHARM REV CODE 636 W HCPCS: Performed by: NURSE PRACTITIONER

## 2025-04-30 PROCEDURE — 82962 GLUCOSE BLOOD TEST: CPT

## 2025-04-30 PROCEDURE — 25000003 PHARM REV CODE 250: Performed by: NURSE PRACTITIONER

## 2025-04-30 PROCEDURE — 97530 THERAPEUTIC ACTIVITIES: CPT | Mod: CQ

## 2025-04-30 PROCEDURE — 27000987 HC MATTRESS, MATRIX LOW PROFILE

## 2025-04-30 PROCEDURE — 97116 GAIT TRAINING THERAPY: CPT | Mod: CQ

## 2025-04-30 PROCEDURE — 11000004 HC SNF PRIVATE

## 2025-04-30 PROCEDURE — 27000958

## 2025-04-30 PROCEDURE — 25000003 PHARM REV CODE 250: Performed by: HOSPITALIST

## 2025-04-30 RX ADMIN — INSULIN ASPART 3 UNITS: 100 INJECTION, SOLUTION INTRAVENOUS; SUBCUTANEOUS at 04:04

## 2025-04-30 RX ADMIN — INSULIN ASPART 5 UNITS: 100 INJECTION, SOLUTION INTRAVENOUS; SUBCUTANEOUS at 11:04

## 2025-04-30 RX ADMIN — AMLODIPINE BESYLATE 10 MG: 10 TABLET ORAL at 08:04

## 2025-04-30 RX ADMIN — GABAPENTIN 300 MG: 300 CAPSULE ORAL at 08:04

## 2025-04-30 RX ADMIN — TAMSULOSIN HYDROCHLORIDE 0.4 MG: 0.4 CAPSULE ORAL at 08:04

## 2025-04-30 RX ADMIN — FLUTICASONE PROPIONATE 50 MCG: 50 SPRAY, METERED NASAL at 08:04

## 2025-04-30 RX ADMIN — INSULIN ASPART 3 UNITS: 100 INJECTION, SOLUTION INTRAVENOUS; SUBCUTANEOUS at 08:04

## 2025-04-30 RX ADMIN — APIXABAN 5 MG: 5 TABLET, FILM COATED ORAL at 08:04

## 2025-04-30 RX ADMIN — METHOCARBAMOL 500 MG: 500 TABLET ORAL at 08:04

## 2025-04-30 RX ADMIN — PANTOPRAZOLE SODIUM 40 MG: 40 TABLET, DELAYED RELEASE ORAL at 08:04

## 2025-04-30 RX ADMIN — INSULIN ASPART 2 UNITS: 100 INJECTION, SOLUTION INTRAVENOUS; SUBCUTANEOUS at 05:04

## 2025-04-30 RX ADMIN — SENNOSIDES AND DOCUSATE SODIUM 1 TABLET: 50; 8.6 TABLET ORAL at 08:04

## 2025-04-30 RX ADMIN — ATORVASTATIN CALCIUM 40 MG: 40 TABLET, FILM COATED ORAL at 08:04

## 2025-04-30 RX ADMIN — GABAPENTIN 300 MG: 300 CAPSULE ORAL at 02:04

## 2025-04-30 NOTE — PT/OT/SLP PROGRESS
Physical Therapy Treatment    Patient Name:  Jono Keita   MRN:  [id number]    Recommendations:     Discharge Recommendations: Moderate Intensity Therapy  Discharge Equipment Recommendations: walker, rolling, cane, quad  Barriers to discharge: None    Assessment:     Jono Keita is a 64 y.o. male admitted with a medical diagnosis of Debility.  He presents with the following impairments/functional limitations: weakness, impaired endurance, impaired functional mobility, impaired balance Pt was able to ambulate 300 ft x 2 with Quad cane and CGA, pt needed vc for proper use of quad cane.    Rehab Prognosis: Fair; patient would benefit from acute skilled PT services to address these deficits and reach maximum level of function.    Recent Surgery: * No surgery found *      Plan:     During this hospitalization, patient to be seen 5 x/week to address the identified rehab impairments via gait training, therapeutic activities, therapeutic exercises, neuromuscular re-education, wheelchair management/training and progress toward the following goals:    Plan of Care Expires:  05/23/25    Subjective     Chief Complaint: none  Patient/Family Comments/goals: return to home  Pain/Comfort:  Pain Rating 1: 0/10  Pain Rating Post-Intervention 1: 0/10      Objective:     Communicated with nursing prior to session.  Patient found supine with chair check upon PT entry to room.     General Precautions: Standard, aspiration  Orthopedic Precautions: N/A  Braces: N/A  Respiratory Status: Room air     Functional Mobility:  Transfers:     Sit to Stand:  stand by assistance with quad cane  Gait: Pt was able to ambulate 300 ft x 2 with CGA and a quad cane with vc for proper use of quad cane      AM-PAC 6 CLICK MOBILITY  Turning over in bed (including adjusting bedclothes, sheets and blankets)?: 4  Sitting down on and standing up from a chair with arms (e.g., wheelchair, bedside commode, etc.): 3  Moving from lying on back to sitting on the side of  the bed?: 3  Moving to and from a bed to a chair (including a wheelchair)?: 3  Need to walk in hospital room?: 3  Climbing 3-5 steps with a railing?: 2  Basic Mobility Total Score: 18       Treatment & Education:    Transfer and gait listed above  Nu step x 6 min, sit to stand x 5    Patient left supine with call button in reach..    GOALS:   Multidisciplinary Problems       Physical Therapy Goals          Problem: Physical Therapy    Goal Priority Disciplines Outcome Interventions   Physical Therapy Goal     PT, PT/OT     Description: Short Term Goals (1 Week)  Patient will be modified independent with sit to stand with use of RW.     Long Term Goals (4 Weeks)  Patient will need CGA with gait with straight cane  x 350 feet on level surfaces.  Patient will be independent with commode transfer.  Patient will need SBA with negotiating 4 steps with rail .                        DME Justifications:   Jono's mobility limitation cannot be sufficiently resolved by the use of a cane. His functional mobility deficit can be sufficiently resolved with the use of a Rolling Walker. Patient's mobility limitation significantly impairs their ability to participate in one of more activities of daily living.  The use of a RW will significantly improve the patient's ability to participate in MRADLS and the patient will use it on regular basis in the home.    Time Tracking:     PT Received On: 04/30/25  PT Start Time: 1508     PT Stop Time: 1538  PT Total Time (min): 30 min     Billable Minutes: Gait Training 15 and Therapeutic Activity 15    Treatment Type: Treatment  PT/PTA: PTA     Number of PTA visits since last PT visit: 3     04/30/2025

## 2025-05-01 LAB
GLUCOSE SERPL-MCNC: 226 MG/DL (ref 70–105)
GLUCOSE SERPL-MCNC: 280 MG/DL (ref 70–105)
GLUCOSE SERPL-MCNC: 354 MG/DL (ref 70–105)

## 2025-05-01 PROCEDURE — 27000958

## 2025-05-01 PROCEDURE — 27000987 HC MATTRESS, MATRIX LOW PROFILE

## 2025-05-01 PROCEDURE — 97530 THERAPEUTIC ACTIVITIES: CPT

## 2025-05-01 PROCEDURE — 97116 GAIT TRAINING THERAPY: CPT | Mod: CQ

## 2025-05-01 PROCEDURE — 97110 THERAPEUTIC EXERCISES: CPT

## 2025-05-01 PROCEDURE — 92507 TX SP LANG VOICE COMM INDIV: CPT

## 2025-05-01 PROCEDURE — 25000003 PHARM REV CODE 250: Performed by: NURSE PRACTITIONER

## 2025-05-01 PROCEDURE — 11000004 HC SNF PRIVATE

## 2025-05-01 PROCEDURE — 63600175 PHARM REV CODE 636 W HCPCS: Performed by: NURSE PRACTITIONER

## 2025-05-01 PROCEDURE — 82962 GLUCOSE BLOOD TEST: CPT

## 2025-05-01 PROCEDURE — 25000003 PHARM REV CODE 250: Performed by: HOSPITALIST

## 2025-05-01 PROCEDURE — 97530 THERAPEUTIC ACTIVITIES: CPT | Mod: CQ

## 2025-05-01 RX ORDER — ACETAMINOPHEN 325 MG/1
650 TABLET ORAL EVERY 6 HOURS PRN
Status: DISCONTINUED | OUTPATIENT
Start: 2025-05-01 | End: 2025-05-02 | Stop reason: HOSPADM

## 2025-05-01 RX ADMIN — APIXABAN 5 MG: 5 TABLET, FILM COATED ORAL at 08:05

## 2025-05-01 RX ADMIN — GABAPENTIN 300 MG: 300 CAPSULE ORAL at 08:05

## 2025-05-01 RX ADMIN — INSULIN ASPART 5 UNITS: 100 INJECTION, SOLUTION INTRAVENOUS; SUBCUTANEOUS at 07:05

## 2025-05-01 RX ADMIN — METHOCARBAMOL 500 MG: 500 TABLET ORAL at 09:05

## 2025-05-01 RX ADMIN — APIXABAN 5 MG: 5 TABLET, FILM COATED ORAL at 09:05

## 2025-05-01 RX ADMIN — INSULIN ASPART 2 UNITS: 100 INJECTION, SOLUTION INTRAVENOUS; SUBCUTANEOUS at 06:05

## 2025-05-01 RX ADMIN — GABAPENTIN 300 MG: 300 CAPSULE ORAL at 02:05

## 2025-05-01 RX ADMIN — PANTOPRAZOLE SODIUM 40 MG: 40 TABLET, DELAYED RELEASE ORAL at 08:05

## 2025-05-01 RX ADMIN — ACETAMINOPHEN 650 MG: 325 TABLET ORAL at 07:05

## 2025-05-01 RX ADMIN — TAMSULOSIN HYDROCHLORIDE 0.4 MG: 0.4 CAPSULE ORAL at 08:05

## 2025-05-01 RX ADMIN — INSULIN ASPART 3 UNITS: 100 INJECTION, SOLUTION INTRAVENOUS; SUBCUTANEOUS at 11:05

## 2025-05-01 RX ADMIN — ATORVASTATIN CALCIUM 40 MG: 40 TABLET, FILM COATED ORAL at 08:05

## 2025-05-01 RX ADMIN — AMLODIPINE BESYLATE 10 MG: 10 TABLET ORAL at 08:05

## 2025-05-01 RX ADMIN — FLUTICASONE PROPIONATE 50 MCG: 50 SPRAY, METERED NASAL at 09:05

## 2025-05-01 RX ADMIN — GABAPENTIN 300 MG: 300 CAPSULE ORAL at 09:05

## 2025-05-01 NOTE — PT/OT/SLP PROGRESS
"Physical Therapy Treatment    Patient Name:  Jono Keita   MRN:  [id number]    Recommendations:     Discharge Recommendations: Moderate Intensity Therapy  Discharge Equipment Recommendations: walker, rolling, cane, quad  Barriers to discharge: None    Assessment:     Jono Keita is a 64 y.o. male admitted with a medical diagnosis of Debility.  He presents with the following impairments/functional limitations: weakness, impaired endurance, impaired functional mobility Pt was able to ambulate 200 ft with SBA with a RW and 200 ft with CGA and a quad cane.    Rehab Prognosis: Fair; patient would benefit from acute skilled PT services to address these deficits and reach maximum level of function.    Recent Surgery: * No surgery found *      Plan:     During this hospitalization, patient to be seen 5 x/week to address the identified rehab impairments via gait training, therapeutic activities, therapeutic exercises, neuromuscular re-education, wheelchair management/training and progress toward the following goals:    Plan of Care Expires:  05/23/25    Subjective     Chief Complaint: none  Patient/Family Comments/goals: return to home  Pain/Comfort:  Pain Rating 1: 0/10  Pain Rating Post-Intervention 1: 0/10      Objective:     Communicated with nursing prior to session.  Patient found up in chair with chair check upon PT entry to room.     General Precautions: Standard, aspiration  Orthopedic Precautions: N/A  Braces: N/A  Respiratory Status: Room air     Functional Mobility:  Transfers:     Sit to Stand:  stand by assistance with rolling walker  Gait: Pt was able to ambulate 200 ft with SBA and a RW. Pt was able to ambulate 200 ft with CGA and a quad cane  Stairs:  Pt ascended/descended 6" curb step with Grab bars with bilateral handrails with Contact Guard Assistance.       AM-PAC 6 CLICK MOBILITY  Turning over in bed (including adjusting bedclothes, sheets and blankets)?: 4  Sitting down on and standing up from a chair " with arms (e.g., wheelchair, bedside commode, etc.): 4  Moving from lying on back to sitting on the side of the bed?: 4  Moving to and from a bed to a chair (including a wheelchair)?: 4  Need to walk in hospital room?: 3  Climbing 3-5 steps with a railing?: 3  Basic Mobility Total Score: 22       Treatment & Education:    Transfer, Gait and Stair training listed above  Sit to stand x 5, nu step x 6 min    Patient left up in chair with call button in reach..    GOALS:   Multidisciplinary Problems       Physical Therapy Goals          Problem: Physical Therapy    Goal Priority Disciplines Outcome Interventions   Physical Therapy Goal     PT, PT/OT     Description: Short Term Goals (1 Week)  Patient will be modified independent with sit to stand with use of RW.     Long Term Goals (4 Weeks)  Patient will need CGA with gait with straight cane  x 350 feet on level surfaces.  Patient will be independent with commode transfer.  Patient will need SBA with negotiating 4 steps with rail .                        DME Justifications:      Time Tracking:     PT Received On: 05/01/25  PT Start Time: 1026     PT Stop Time: 1051  PT Total Time (min): 25 min     Billable Minutes: Gait Training 10 and Therapeutic Activity 15    Treatment Type: Treatment  PT/PTA: PTA     Number of PTA visits since last PT visit: 4     05/01/2025

## 2025-05-01 NOTE — PT/OT/SLP PROGRESS
Occupational Therapy   Treatment    Name: Jono Keita  MRN: [id number]  Admitting Diagnosis:  Debility       Recommendations:     Discharge Recommendations: Moderate Intensity Therapy  Discharge Equipment Recommendations:  none  Barriers to discharge:       Assessment:     Jono Keita is a 64 y.o. male with a medical diagnosis of Debility.  He presents with no new complaints. Performance deficits affecting function are weakness, impaired endurance, impaired self care skills, impaired functional mobility, gait instability, impaired balance, decreased coordination.     Rehab Prognosis:  Good; patient would benefit from acute skilled OT services to address these deficits and reach maximum level of function.       Plan:     Patient to be seen 5 x/week to address the above listed problems via self-care/home management, therapeutic activities, therapeutic exercises, neuromuscular re-education  Plan of Care Expires:    Plan of Care Reviewed with: patient, spouse    Subjective     Chief Complaint: no complaints  Patient/Family Comments/goals: home with family/wife, planning to setup outpt physical therapy post d/c   Pain/Comfort:       Objective:     Communicated with: pt and PT prior to session.  Patient found up in chair with   upon OT entry to room.    General Precautions: Standard, fall    Orthopedic Precautions:   Braces:    Respiratory Status: Room air     Occupational Performance:     Bed Mobility:    Mod i    Functional Mobility/Transfers:  Patient completed Sit <> Stand Transfer with supervision  with  rolling walker   Patient completed Bed <> Chair Transfer using Step Transfer technique with supervision with rolling walker  Functional Mobility: RW >300 feet SVN only; pt states he is using SPC with PT as progression    Activities of Daily Living:  Feeding:  independence no AE  Grooming: independence at sinkside  Bathing: modified independence at sinkside  Upper Body Dressing: independence after setup  Lower Body  Dressing: independence after setup  Toileting: supervision regular toilet      [facility] 6 Click ADL: 23    Treatment & Education:  To improve UB endurance, strength, OT facilitated pt with:  UBE x 8 min with no RB's throughout, level 4 resist intermittently, pt in standing, no difficulty    To improve sit to stand and functional transitions and transfers, pushing up from surfaces more effectively and safely:  seated black Lat pulls and tricep presses at 30 reps x 1 sets from seated position      To increase functional mobility skills:  OT engaged pt in walking in hallway to and from his hospital  room >300 feet x 2, pt requiring SVN and RW, min  cueing for safety      Patient left up in chair with call button in reach    GOALS:   Multidisciplinary Problems       Occupational Therapy Goals          Problem: Occupational Therapy    Goal Priority Disciplines Outcome Interventions   Occupational Therapy Goal     OT, PT/OT Progressing    Description: STG:  Pt will perform grooming with setup  Pt will bathe with I after set up  Pt will perform UE dressing with I  Pt will perform LE dressing with I  Pt will sit EOB x 15 min with no assistance  Pt will transfer bed/chair/bsc with Mod I  Pt will perform standing task x 15 min with supervision assistance  Pt will tolerate 45 minutes of tx without fatigue      LT.Restore to max I with self care and mobility.                           Time Tracking:     OT Date of Treatment: 25  OT Start Time: 0935  OT Stop Time: 1002  OT Total Time (min): 27 min    Billable Minutes:Therapeutic Activity 12  Therapeutic Exercise 15               2025

## 2025-05-01 NOTE — CONSULTS
[facility]  Adult Nutrition  Follow-up Note         Reason for Assessment  Reason For Assessment: RD follow-up        Assessment and Plan  5/01/2025 RD Follow up: Patient continues on a 2000 calorie consistent carbohydrate diet. PO intakes good with % per flowsheets. Patient denies issues with appetite at RD visit. Briefly discussed therapeutic diet and reason why it is ordered. Patient denied questions or concerns. No new weight noted. Labs/meds reviewed. Recommend to continue diet as tolerated. RD Following.    Consult received and appreciated. Consult to assess/educate regarding nutritional needs. Patient is a 63 yo M with PMH of HTN, T2DM, Stage 3 CKD, CVA, BPH, and HLD who presents from [facility] due to debility related to recent CVA. Patient suffered a left pontine stroke on 4/12/25. Patient noted to have additional previous stroke in past. Recent stroke resulted in residual right hemiparesis.    Patient is 79.1 kg (174 lb) with a BMI of 29.95 and is overweight. Chart review reveals limited weight history. Per malnutrition screening tool, patient denied weight loss and poor appetite (MST 0). Patient is currently on a Consistent Carbohydrate Diet and is consuming 100% of meals per flowsheet.     Nutrition Recommendations  Recommend to change diet order to Heart-Healthy, Consistent Carbohydrate Diet given patient admitted secondary to stroke with history of additional strokes. Patient would benefit from diet education related to a cardiac, diabetic-friendly diet upon follow-up when next on-site prior to discharge. Will attach nutrition education materials to discharge instructions for time being.     Last Bowel Movement: 05/01/25    Medications/labs reviewed. RD following.    Learning Needs/Social Determinants of Health    Learning Assessment       04/25/2025 1505 [facility] (4/25/2025 - Present)   Created by Rozina Burger, RN - RN  (Nurse) Status: Complete                 PRIMARY LEARNER     Primary Learner Name:  Jono Keita  - 04/25/2025 1505    Relationship:  Patient AG - 04/25/2025 1505    Does the primary learner have any barriers to learning?:  No Barriers  - 04/25/2025 1505    What is the preferred language of the primary learner?:  English  - 04/25/2025 1505    Is an  required?:  No  - 04/25/2025 1505    How does the primary learner prefer to learn new concepts?:  Listening  - 04/25/2025 1505    How often do you need to have someone help you read instructions, pamphlets, or written material from your doctor or pharmacy?:  Never  - 04/25/2025 1505        CO-LEARNER #1     No question answered        CO-LEARNER #2     No question answered        SPECIAL TOPICS     No question answered        ANSWERED BY:     No question answered        Comments         Edit History       Rozina Burger, RN - RN (Nurse)   04/25/2025 1505                          Social Drivers of Health     Tobacco Use: Medium Risk (4/25/2025)    Patient History     Smoking Tobacco Use: Unknown     Smokeless Tobacco Use: Former     Passive Exposure: Not on file   Alcohol Use: Alcohol Misuse (4/25/2025)    AUDIT-C     Frequency of Alcohol Consumption: 2-3 times a week     Average Number of Drinks: 3 or 4     Frequency of Binge Drinking: Less than monthly   Financial Resource Strain: Low Risk  (4/27/2025)    Overall Financial Resource Strain (CARDIA)     Difficulty of Paying Living Expenses: Not very hard   Food Insecurity: No Food Insecurity (4/27/2025)    Hunger Vital Sign     Worried About Running Out of Food in the Last Year: Never true     Ran Out of Food in the Last Year: Never true   Transportation Needs: No Transportation Needs (4/25/2025)    PRAPARE - Transportation     Lack of Transportation (Medical): No     Lack of Transportation (Non-Medical): No   Physical Activity: Inactive (4/25/2025)    Exercise Vital Sign     Days of Exercise per  Week: 0 days     Minutes of Exercise per Session: 0 min   Stress: No Stress Concern Present (4/27/2025)    [employer] [place] [employer] - Occupational Stress Questionnaire     Feeling of Stress : Only a little   Housing Stability: Low Risk  (4/27/2025)    Housing Stability Vital Sign     Unable to Pay for Housing in the Last Year: No     Number of Times Moved in the Last Year: Not on file     Homeless in the Last Year: No   Depression: Not at risk (4/12/2025)    Received from [facility] Its Subsidiaries and Affiliates    PHQ-2     PHQ-2 Total: 0   Utilities: Not At Risk (4/27/2025)    [facility] Utilities     Threatened with loss of utilities: No   Health Literacy: Adequate Health Literacy (4/27/2025)     Health Literacy     Frequency of need for help with medical instructions: Rarely   Social Isolation: Socially Integrated (4/25/2025)    Social Isolation     Social Isolation: 1     Malnutrition  Is Patient Malnourished: No    Nutrition Diagnosis  Altered nutrition related labs related to DM as evidenced by elevated BG    Recent Labs   Lab 05/01/25  1132   POCGLU 280*     Comments on Glucose: PMH T2DM    Nutrition Prescription / Recommendations  Recommendation/Intervention: Recommend to continue Consistent carbohydrate diet. Encourage po intakes.  Goals: Weight maintenance during admission, consuming 50 - 75 % meals, improve blood glucose levels  Nutrition Goal Status: goal met  Communication of RD Recs: discussed on rounds  Current Diet Order: Consistent Carbohydrate Diet  Chewing or Swallowing Difficulty?: No Chewing or swallowing difficulty  Recommended Diet: Consistent Carbohydrate 2000 (75g Carbs) and Heart Healthy  Recommended Oral Supplement: No Oral Supplements  Is Nutrition Support Recommended: No  Is Nutrition Education Recommended: Yes - Type: Heart-Healthy, Diabetic Medical Nutrition Therapy - Education Given: yes    Monitor and Evaluation  % current  "Intake: P.O. intake of 75 - 100 %  % intake to meet estimated needs: 50 - 75 %  Monitor and Evaluation: Food and beverage intake, Protein intake, Carbohydrate intake, Diet order, Food and nutrition knowledge, Weight, Beliefs and attitudes, Electrolyte and renal panel, Gastrointestinal profile, Glucose/endocrine profile, Inflammatory profile, Lipid profile, Nutrition focused physical findings    Current Medical Diagnosis and Past Medical History     History reviewed. No pertinent past medical history.    Nutrition/Diet History  Spiritual, Cultural Beliefs, Adventism Practices, Values that Affect Care: no  Food Allergies: NKFA  Factors Affecting Nutritional Intake: None identified at this time    Lab/Procedures/Meds  No results for input(s): "NA", "K", "BUN", "CREATININE", "CALCIUM", "ALBUMIN", "CL", "ALT", "AST", "PHOS" in the last 72 hours.  Note: BUN, Cr high (Stage 3 CKD). Cl- high (possibly related to renal dysfunction, meds)    Last A1c:   Lab Results   Component Value Date    HGBA1C 9.3 (H) 04/13/2025   Note: A1c elevated. Goal for individuals with diabetes < 7     Lab Results   Component Value Date    RBC 5.00 04/26/2025    HGB 14.5 04/26/2025    HCT 44.2 04/26/2025    MCV 88.4 04/26/2025    MCH 29.0 04/26/2025    MCHC 32.8 04/26/2025     Pertinent Labs Reviewed: reviewed  Pertinent Medications Reviewed: reviewed    Scheduled Meds:   amLODIPine  10 mg Oral Daily    apixaban  5 mg Oral BID    atorvastatin  40 mg Oral Daily    fluticasone propionate  1 spray Each Nostril BID    gabapentin  300 mg Oral TID    pantoprazole  40 mg Oral Daily    tamsulosin  0.4 mg Oral Daily   Note: atorvastatin (not with grapefruit/grapefruit juice)    Continuous Infusions:  PRN Meds:.  Current Facility-Administered Medications:     acetaminophen, 650 mg, Oral, Q6H PRN    acetaminophen, 650 mg, Oral, Q6H PRN    calcium carbonate, 500 mg, Oral, BID PRN    dextrose 50%, 12.5 g, Intravenous, PRN    dextrose 50%, 25 g, Intravenous, " "PRN    glucagon (human recombinant), 1 mg, Intramuscular, PRN    glucose, 16 g, Oral, PRN    glucose, 24 g, Oral, PRN    insulin aspart U-100, 0-5 Units, Subcutaneous, QID (AC + HS) PRN    melatonin, 6 mg, Oral, Nightly PRN    methocarbamoL, 500 mg, Oral, Q6H PRN    senna-docusate, 1 tablet, Oral, BID PRN    Anthropometrics  Height: 5' 4" (162.6 cm)  Height (inches): 64 in  Height Method: Stated  Weight: 79.1 kg (174 lb 7.6 oz)  Weight (lb): 174.48 lb  Weight Method: Bed Scale  Ideal Body Weight (IBW), Male: 130 lb  % Ideal Body Weight, Male (lb): 134.23 %  BMI (Calculated): 29.9       Estimated/Assessed Needs  RMR (Bailey-St. Jeor Equation): 1487.41     Temp: 98 °F (36.7 °C)Oral  Weight Used For Calorie Calculations: 79.1 kg (174 lb 6.1 oz)     Energy Calorie Requirements (kcal): 2,000 - 2,400 kcal (25 - 30 kcal/kg ABW)    Weight Used For Protein Calculations: 79.1 kg (174 lb 6.1 oz)  Protein Requirements: 79 - 95 g (1 - 1.2 g/kg ABW) (increased protein needs 2/2 older age, preservation muscle mass)      Fluid Requirements (mL): 1 mL/kcal  CHO Requirement: 45 - 55 % total kcal (T2DM)    Nutrition by Nursing  Diet/Nutrition Received: consistent carb/diabetic diet  Intake (%): 100%  Diet/Feeding Assistance: tray set-up  Diet/Feeding Tolerance: good                Nutrition Follow-Up  RD Follow-up?: Yes    Nutrition Discharge Planning: Therapeutic diet (comments)      Available via Secure Chat  "

## 2025-05-01 NOTE — PLAN OF CARE
Problem: Fall Injury Risk  Goal: Absence of Fall and Fall-Related Injury  Outcome: Progressing     Problem: Stroke Rehabilitation  Goal: Optimal Adjustment to Stroke  Outcome: Progressing

## 2025-05-01 NOTE — PLAN OF CARE
Problem: Adult Inpatient Plan of Care  Goal: Absence of Hospital-Acquired Illness or Injury  Outcome: Ongoing

## 2025-05-01 NOTE — PT/OT/SLP PROGRESS
"Speech Language Pathology Treatment    Patient Name:  Jono Keita   MRN:  [id number]  Admitting Diagnosis: Debility    Recommendations:                 General Recommendations:  Speech/language therapy and Cognitive-linguistic therapy  Diet recommendations:  Regular, Liquid Diet Level: Thin   Aspiration Precautions: 1 bite/sip at a time and Small bites/sips   General Precautions: Standard, aspiration  Communication strategies:  provide increased time to answer    Assessment:     Jono Keita is a 64 y.o. 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   Pt with difficulty in word finding , slightly slurred speech and difficulty with diadochokinesis.      Subjective     Pt alert upright in wheelchair, agree to ST services  Patient goals: talk better, go home      Respiratory Status: Room air    Objective:     Has the patient been evaluated by SLP for swallowing?      Keep patient NPO?     Current Respiratory Status:        Pt completed task for lingual protrusion/lateralization, elevation/depression x8 sets of 10 with ST model throughout.  Pt completed tasks for labial retraction/protrusion, buccal elevation across 6 sets of 10 with ST model.   Pt completed task for answering 'wh' questions x15 with 90% accuracy.  Pt completed task for divergent naming with 87% accuracy.  Pt completed task for convergent naming with 100% accuracy.      Goals:   Multidisciplinary Problems       SLP Goals          Problem: SLP    Goal Priority Disciplines Outcome   SLP Goal     SLP Progressing   Description: Patient will complete lingual protrusions, elevations, depressions, and lats with mod-max accuracy Independently.   Patient will complete labial protrusions and retractions with mod-max accuracy Independently.   Patient will utilize dysarthria techniques with 80% accuracy Independently.   Patient will answer "wh" questions with 80% accuracy Independently.   Patient " will complete divergent naming tasks with 80% accuracy Independently.   Patient will complete convergent naming tasks with 80% accuracy Independently.   Patient will complete problem solving tasks with 80% accuracy Independently.   Patient will complete verbal reasoning tasks with 80% accuracy Independently.                             Plan:     Patient to be seen:  3 x/week   Plan of Care expires:  05/26/25  Plan of Care reviewed with:  patient   SLP Follow-Up:  Yes       Discharge recommendations:  Moderate Intensity Therapy   Barriers to Discharge:  Level of Skilled Assistance Needed currently    Time Tracking:     SLP Treatment Date:   05/01/25  Speech Start Time:  1220  Speech Stop Time:  1248     Speech Total Time (min):  28 min    Billable Minutes: Speech Therapy Individual 28    05/01/2025

## 2025-05-01 NOTE — PLAN OF CARE
[facility]   Interdisciplinary Team Meeting    Patient: Jono Kieta   Today's Date: 5/1/2025   Estimated D/C Date: 5/8/2025        Physician: Rico Braden DO Nurse Practitioner: Benita Suero NP   Pharmacy: Pepe Mari, PharmD Unit Director: Dana Chowdhury RN   : Ortiz Villalba RN Physical/Occupational Therapy: Danna Reyes OT   Speech Therapy: ST Daja Activity Therapy: Janet Wooten   Nursing: Dana Chowdhury RN  Respiratory: Eva Ellis,  Dietary: Abiola Toth RD  Other: N/A     Nursing  New Symptoms/Problems: N/A  Last Bowel Movement: 04/29/25  Urine: continent  Banks: No  Bowel: continent   Constipated: No  Diarrhea: No   Isolation: No  Wound Care: No  Wound Location/Tx: N/A  Cognition: WNL  Aspiration Precautions: Yes  Comment(s): N/A    Respiratory:   O2 Device: Room Air  O2 Flow: N/A  SpO2: 99%  Neb Tx: No  Comment(s): N/A    Dietary    Nutrition:  CONSISTENT CARB  Comment(s): N/A    Speech Therapy  Speech/Swallowing:  SEE SLP NOTES  Comment(s): No    Physical Therapy  Gait/Assistive Device: AMBULATORY WITH QUAD CANE ELOS: Plan to DC 5/8/2025    Transfers: Modified Independent  Bed Mobility: Modified Independent Range of Motion/Restrictions: N/A  Comment(s): N/A     Occupational Therapy  Eating/Grooming: Independent Toileting: Supervision or Set-up Assistance   Bathing: Supervision or Set-up Assistance Dressing (Upper Body): Modified Independent   Dressing (Lower Body): Modified Independent Comment(s): N/A     Activity Therapy  Level of participation: Active participation  Comment(s): N/A    Pharmacy   Medication Changes (see MD orders in chart): No  Labs Reviewed: Yes  New Lab Orders: No  Comment(s): N/A      Tx Plan/Recommendations reviewed with family and/or patient on (date) 5/1.  Additional family Conference/Training: N/A  D/C Plan/Recommendations: Home with HH and Home with family  SIN:  5/8/2025  Comment(s): DISCHARGE HOME WITH OUTPATIENT THERAPY

## 2025-05-02 VITALS
HEIGHT: 64 IN | RESPIRATION RATE: 20 BRPM | SYSTOLIC BLOOD PRESSURE: 157 MMHG | WEIGHT: 174.5 LBS | HEART RATE: 77 BPM | OXYGEN SATURATION: 99 % | BODY MASS INDEX: 29.79 KG/M2 | DIASTOLIC BLOOD PRESSURE: 81 MMHG | TEMPERATURE: 98 F

## 2025-05-02 PROBLEM — Z91.89 AT RISK FOR VENOUS THROMBOEMBOLISM (VTE): Status: RESOLVED | Noted: 2025-04-25 | Resolved: 2025-05-02

## 2025-05-02 PROBLEM — R53.81 DEBILITY: Status: RESOLVED | Noted: 2025-04-28 | Resolved: 2025-05-02

## 2025-05-02 PROBLEM — Z91.81 AT RISK FOR FALLS: Status: RESOLVED | Noted: 2025-04-25 | Resolved: 2025-05-02

## 2025-05-02 LAB
ANION GAP SERPL CALCULATED.3IONS-SCNC: 14 MMOL/L (ref 7–16)
BASOPHILS # BLD AUTO: 0.02 K/UL (ref 0–0.2)
BASOPHILS NFR BLD AUTO: 0.4 % (ref 0–1)
BUN SERPL-MCNC: 22 MG/DL (ref 8–26)
BUN/CREAT SERPL: 14 (ref 6–20)
CALCIUM SERPL-MCNC: 8.9 MG/DL (ref 8.8–10)
CHLORIDE SERPL-SCNC: 109 MMOL/L (ref 98–107)
CO2 SERPL-SCNC: 20 MMOL/L (ref 23–31)
CREAT SERPL-MCNC: 1.58 MG/DL (ref 0.72–1.25)
DIFFERENTIAL METHOD BLD: ABNORMAL
EGFR (NO RACE VARIABLE) (RUSH/TITUS): 48 ML/MIN/1.73M2
EOSINOPHIL # BLD AUTO: 0.11 K/UL (ref 0–0.5)
EOSINOPHIL NFR BLD AUTO: 2.3 % (ref 1–4)
ERYTHROCYTE [DISTWIDTH] IN BLOOD BY AUTOMATED COUNT: 12.1 % (ref 11.5–14.5)
GLUCOSE SERPL-MCNC: 244 MG/DL (ref 70–105)
GLUCOSE SERPL-MCNC: 272 MG/DL (ref 82–115)
GLUCOSE SERPL-MCNC: 386 MG/DL (ref 70–105)
HCT VFR BLD AUTO: 43.4 % (ref 40–54)
HGB BLD-MCNC: 14.2 G/DL (ref 13.5–18)
LYMPHOCYTES # BLD AUTO: 1.15 K/UL (ref 1–4.8)
LYMPHOCYTES NFR BLD AUTO: 24.5 % (ref 27–41)
MCH RBC QN AUTO: 28.6 PG (ref 27–31)
MCHC RBC AUTO-ENTMCNC: 32.7 G/DL (ref 32–36)
MCV RBC AUTO: 87.3 FL (ref 80–96)
MONOCYTES # BLD AUTO: 0.49 K/UL (ref 0–0.8)
MONOCYTES NFR BLD AUTO: 10.4 % (ref 2–6)
MPC BLD CALC-MCNC: 9.4 FL (ref 9.4–12.4)
NEUTROPHILS # BLD AUTO: 2.93 K/UL (ref 1.8–7.7)
NEUTROPHILS NFR BLD AUTO: 62.4 % (ref 53–65)
PLATELET # BLD AUTO: 198 K/UL (ref 150–400)
POTASSIUM SERPL-SCNC: 4.5 MMOL/L (ref 3.5–5.1)
RBC # BLD AUTO: 4.97 M/UL (ref 4.6–6.2)
SODIUM SERPL-SCNC: 138 MMOL/L (ref 136–145)
WBC # BLD AUTO: 4.7 K/UL (ref 4.5–11)

## 2025-05-02 PROCEDURE — 99316 NF DSCHRG MGMT 30 MIN+: CPT | Mod: ,,, | Performed by: HOSPITALIST

## 2025-05-02 PROCEDURE — 85025 COMPLETE CBC W/AUTO DIFF WBC: CPT | Performed by: HOSPITALIST

## 2025-05-02 PROCEDURE — 63600175 PHARM REV CODE 636 W HCPCS: Performed by: NURSE PRACTITIONER

## 2025-05-02 PROCEDURE — 25000003 PHARM REV CODE 250: Performed by: HOSPITALIST

## 2025-05-02 PROCEDURE — 27000958

## 2025-05-02 PROCEDURE — 97116 GAIT TRAINING THERAPY: CPT | Mod: CQ

## 2025-05-02 PROCEDURE — 27000987 HC MATTRESS, MATRIX LOW PROFILE

## 2025-05-02 PROCEDURE — 25000003 PHARM REV CODE 250: Performed by: NURSE PRACTITIONER

## 2025-05-02 PROCEDURE — 97530 THERAPEUTIC ACTIVITIES: CPT | Mod: CQ

## 2025-05-02 PROCEDURE — 82962 GLUCOSE BLOOD TEST: CPT

## 2025-05-02 PROCEDURE — 80048 BASIC METABOLIC PNL TOTAL CA: CPT | Performed by: HOSPITALIST

## 2025-05-02 PROCEDURE — 36415 COLL VENOUS BLD VENIPUNCTURE: CPT | Performed by: HOSPITALIST

## 2025-05-02 RX ORDER — ATORVASTATIN CALCIUM 40 MG/1
40 TABLET, FILM COATED ORAL DAILY
Qty: 90 TABLET | Refills: 1 | Status: SHIPPED | OUTPATIENT
Start: 2025-05-02

## 2025-05-02 RX ORDER — AMLODIPINE BESYLATE 10 MG/1
10 TABLET ORAL DAILY
Qty: 90 TABLET | Refills: 1 | Status: SHIPPED | OUTPATIENT
Start: 2025-05-02

## 2025-05-02 RX ORDER — DAPAGLIFLOZIN 10 MG/1
10 TABLET, FILM COATED ORAL DAILY
Qty: 90 TABLET | Refills: 1 | Status: SHIPPED | OUTPATIENT
Start: 2025-05-02

## 2025-05-02 RX ORDER — TAMSULOSIN HYDROCHLORIDE 0.4 MG/1
0.4 CAPSULE ORAL DAILY
Qty: 90 CAPSULE | Refills: 1 | Status: SHIPPED | OUTPATIENT
Start: 2025-05-02

## 2025-05-02 RX ADMIN — FLUTICASONE PROPIONATE 50 MCG: 50 SPRAY, METERED NASAL at 09:05

## 2025-05-02 RX ADMIN — PANTOPRAZOLE SODIUM 40 MG: 40 TABLET, DELAYED RELEASE ORAL at 09:05

## 2025-05-02 RX ADMIN — TAMSULOSIN HYDROCHLORIDE 0.4 MG: 0.4 CAPSULE ORAL at 09:05

## 2025-05-02 RX ADMIN — INSULIN ASPART 2 UNITS: 100 INJECTION, SOLUTION INTRAVENOUS; SUBCUTANEOUS at 06:05

## 2025-05-02 RX ADMIN — INSULIN ASPART 5 UNITS: 100 INJECTION, SOLUTION INTRAVENOUS; SUBCUTANEOUS at 11:05

## 2025-05-02 RX ADMIN — GABAPENTIN 300 MG: 300 CAPSULE ORAL at 09:05

## 2025-05-02 RX ADMIN — AMLODIPINE BESYLATE 10 MG: 10 TABLET ORAL at 09:05

## 2025-05-02 RX ADMIN — APIXABAN 5 MG: 5 TABLET, FILM COATED ORAL at 09:05

## 2025-05-02 RX ADMIN — ATORVASTATIN CALCIUM 40 MG: 40 TABLET, FILM COATED ORAL at 09:05

## 2025-05-02 NOTE — PLAN OF CARE
[facility] Medical Surgical Unit  Discharge Final Note    Primary Care Provider: Leon Barr MD    Expected Discharge Date: 5/2/2025    Final Discharge Note (most recent)       Final Note - 05/02/25 1100          Final Note    Assessment Type Final Discharge Note     Anticipated Discharge Disposition --   home with outpatietn therapy    What phone number can be called within the next 1-3 days to see how you are doing after discharge? [phone]     Hospital Resources/Appts/Education Provided Provided patient/caregiver with written discharge plan information        Post-Acute Status    Post-Acute Authorization Other     Other Status No Post-Acute Service Needs     Discharge Delays None known at this time                     Important Message from Medicare  Important Message from Medicare regarding Discharge Appeal Rights: Given to patient/caregiver, Explained to patient/caregiver, Signed/date by patient/caregiver     Date IMM was signed: 05/02/25  Time IMM was signed: 1100    Contact Info       Ramos Christian FNP   Specialty: Family Medicine    [address]  [address]  [place] [id number]   Phone: [phone]       Next Steps: Go on 5/7/2025    Instructions: Appt time is at 2:00 PM.    [facility]  Specialty: Stroke    Mike Nation Jr., MD   Specialty: Urology    [address] [place] [address]  [address]  [place] [address]   Phone: [phone]       Next Steps: Go on 7/30/2025    Instructions: Appt time is at 11:00 AM.     Established while pt was at [facility].     For urology f/u d/t CKD & BPH          Mr. Keita to dc home with outpatient therapy. Referral sent to [facility] therapy dept. Questions/concerns addressed at time of dc assessment.     [facility] received 5/2/2024 with last covered date being 5/4, patient and spouse chose to dc home today, 5/2

## 2025-05-02 NOTE — DISCHARGE SUMMARY
[facility]  Hospital Medicine  Discharge Summary      Patient Name: Jono Keita  MRN: [id number]  [facility]: [id number]  Patient Class: IP- Swing  Admission Date: 4/25/2025  Hospital Length of Stay: 7 days  Discharge Date and Time: 05/02/2025 11:03 AM  Attending Physician: Rico Braden DO   Discharging Provider: Rico Braden DO  Primary Care Provider: Leon Barr MD    Primary Care Team: Networked reference to record PCT     HPI:   Mr. Keita is a 65 y/o AAM with PMH of HTN, T2DM, Stage 3 CKD, CVA, BPH, and HLD who presents from [facility] to [facility] Sw[facility] Bed [facility] for PT/OT/ST due to debility related to recent CVA. Patient suffered a left pontine stroke on 4/12/25. Patient states an additional previous stroke in the past.  His recent stroke resulted in residual right hemiparesis.    * No surgery found *      Hospital Course:   4/25 Patient sitting up in WC talking on phone in NAD with wife at bedside. Patient is pleasant an answers questions appropriately. He and his wife express understanding of plan of treatment and agree with plan.    5.2 Did really well and has improved.  Stable for DC home. Follow up with PCP.  DC with Farxiga instead of Lantus.  But PCP can track and see if more needed.      Goals of Care Treatment Preferences:  Code Status: Full Code      SDOH Screening:  The patient was screened for utility difficulties, food insecurity, transport difficulties, housing insecurity, and interpersonal safety and there were no concerns identified this admission.     Consults:   Consults (From admission, onward)          Status Ordering Provider     Inpatient consult to Registered Dietitian/Nutritionist  Once        Provider:  (Not yet assigned)    Completed EMILIA TALBERT            Assessment & Plan  BPH (benign prostatic hyperplasia)  Resume meds.     CKD (chronic kidney disease) stage 3, GFR 30-59 ml/min  Creatine stable for now. BMP reviewed- noted Estimated  "Creatinine Clearance: 44.3 mL/min (A) (based on SCr of 1.58 mg/dL (H)). according to latest data. Based on current GFR, CKD stage is stage 3 - GFR 30-59.  Monitor UOP and serial BMP and adjust therapy as needed. Renally dose meds. Avoid nephrotoxic medications and procedures.  Diabetes mellitus  Patient's FSGs are uncontrolled due to hyperglycemia on current medication regimen.  Last A1c reviewed-   Lab Results   Component Value Date    HGBA1C 9.3 (H) 04/13/2025     Most recent fingerstick glucose reviewed- No results for input(s): "POCTGLUCOSE" in the last 24 hours.  Current correctional scale  Low  Maintain anti-hyperglycemic dose as follows-   Antihyperglycemics (From admission, onward)      Start     Stop Route Frequency Ordered    04/25/25 1700  insulin aspart U-100 injection 0-5 Units         -- SubQ Before meals & nightly PRN 04/25/25 1500          Hold Oral hypoglycemics while patient is in the hospital.  History of CVA (cerebrovascular accident)  Here for PT/OT    Primary hypertension  Patient's blood pressure range in the last 24 hours was: BP  Min: 155/75  Max: 157/81.The patient's inpatient anti-hypertensive regimen is listed below:  Current Antihypertensives  amLODIPine tablet 10 mg, Daily, Oral  amlodipine (NORVASC) tablet, Daily, Oral    Plan  - BP is uncontrolled, will adjust as follows: maybe add extra diuertic   -   Final Active Diagnoses:    Diagnosis Date Noted POA    BPH (benign prostatic hyperplasia) [N40.0] 04/13/2025 Yes    History of CVA (cerebrovascular accident) [Z86.73] 04/12/2025 Not Applicable    CKD (chronic kidney disease) stage 3, GFR 30-59 ml/min [N18.30] 04/12/2025 Yes    Primary hypertension [I10] 09/16/2021 Yes    Diabetes mellitus [E11.9] 09/16/2021 Yes      Problems Resolved During this Admission:    Diagnosis Date Noted Date Resolved POA    PRINCIPAL PROBLEM:  Debility [R53.81] 04/28/2025 05/02/2025 Yes    At risk for falls [Z91.81] 04/25/2025 05/02/2025 Not Applicable    At " risk for venous thromboembolism (VTE) [Z91.89] 04/25/2025 05/02/2025 Yes       Discharged Condition: good    Disposition: Home or Self Care    Follow Up:   Follow-up Information       Ramos Christian FNP. Go on 5/7/2025.    Specialty: Family Medicine  Why: Appt time is at 2:00 PM.    [facility]  Specialty: Stroke  Contact information:  [address] [place] [address]  [address]  [facility] [address]  [phone]               Mike Nation Jr., MD. Go on 7/30/2025.    Specialty: Urology  Why: Appt time is at 11:00 AM.     Established while pt was at [facility].     For urology f/u d/t CKD & BPH  Contact information:  [address] [place] [address]  [address]  [facility] [address]  [phone]                           Patient Instructions:   No discharge procedures on file.    Significant Diagnostic Studies: N/A    Pending Diagnostic Studies:       None           Medications:  Reconciled Home Medications:      Medication List        CONTINUE taking these medications      amLODIPine 10 MG tablet  Commonly known as: NORVASC  Take 1 tablet (10 mg total) by mouth once daily.     apixaban 5 mg Tab  Commonly known as: ELIQUIS  Take 1 tablet (5 mg total) by mouth 2 (two) times daily. Take 1 tablet by mouth in the morning and 1 tablet before bedtime.     atorvastatin 40 MG tablet  Commonly known as: LIPITOR  Take 1 tablet (40 mg total) by mouth once daily. Take 1 tablet daily by mouth.     dapagliflozin propanediol 10 mg tablet  Commonly known as: Farxiga  Take 1 tablet (10 mg total) by mouth once daily. Take 10 mg by mouth once daily.     fluticasone propionate 50 mcg/actuation nasal spray  Commonly known as: FLONASE  1 spray by Each Nostril route 2 (two) times daily. Take 1 spray in each nostril in the morning and 1 spray in each nostril before bedtime.     gabapentin 400 MG capsule  Commonly known as: NEURONTIN  Take 400 mg by mouth 3 (three) times daily. Take 1 capsule by mouth in the morning and 1 capsule at noon and 1  capsule in the evening.     tamsulosin 0.4 mg Cap  Commonly known as: FLOMAX  Take 1 capsule (0.4 mg total) by mouth once daily. Take 1 capsule by mouth once daily.            STOP taking these medications      aspirin 81 MG Chew     insulin regular 100 unit/mL injection     LANTUS SOLOSTAR U-100 INSULIN SUBQ     methocarbamoL 750 MG Tab  Commonly known as: ROBAXIN     omeprazole 20 MG capsule  Commonly known as: PRILOSEC              Indwelling Lines/Drains at time of discharge:   Lines/Drains/Airways       None                   Time spent on the discharge of patient: 32 minutes         Rico Braden DO  Department of Hospital Medicine  [facility]   PAST MEDICAL HISTORY:  No pertinent past medical history

## 2025-05-02 NOTE — NURSING
Pt and wife given discharge instructions and acknowledged understanding. Pt wheeled in wheelchair to personal vehicle. Pt denied any pain. No signs of distress noted. Pt left facility around 1215 with wife in personal vehicle.

## 2025-05-02 NOTE — ASSESSMENT & PLAN NOTE
Creatine stable for now. BMP reviewed- noted Estimated Creatinine Clearance: 44.3 mL/min (A) (based on SCr of 1.58 mg/dL (H)). according to latest data. Based on current GFR, CKD stage is stage 3 - GFR 30-59.  Monitor UOP and serial BMP and adjust therapy as needed. Renally dose meds. Avoid nephrotoxic medications and procedures.

## 2025-05-02 NOTE — PLAN OF CARE
Problem: Adult Inpatient Plan of Care  Goal: Optimal Comfort and Wellbeing  Outcome: Progressing     Problem: Fall Injury Risk  Goal: Absence of Fall and Fall-Related Injury  Outcome: Progressing     Problem: Stroke Rehabilitation  Goal: Optimal Mobility [place] and Safety  Outcome: Progressing     Problem: Diabetes Comorbidity  Goal: Blood Glucose Level Within Targeted Range  Outcome: Progressing

## 2025-05-02 NOTE — PLAN OF CARE
Problem: Adult Inpatient Plan of Care  Goal: Plan of Care Review  Outcome: Met  Goal: Patient-Specific Goal (Individualized)  Outcome: Met  Goal: Absence of Hospital-Acquired Illness or Injury  Outcome: Met  Goal: Optimal Comfort and Wellbeing  Outcome: Met  Goal: Readiness for Transition of Care  Outcome: Met     Problem: Fall Injury Risk  Goal: Absence of Fall and Fall-Related Injury  Outcome: Met     Problem: Stroke Rehabilitation  Goal: Optimal Adjustment to Stroke  Outcome: Met  Goal: Optimal Safe BADL Performance  Outcome: Met  Goal: Effective Bowel Elimination/Continence  Outcome: Met  Goal: Optimal Cognitive Function  Outcome: Met  Goal: Effective Communication Skills  Outcome: Met  Goal: Optimal Safe IADL Performance  Outcome: Met  Goal: Optimal Mobility [place] and Safety  Outcome: Met  Goal: Optimal Movement and Motor Control  Outcome: Met  Goal: Optimal Nutrition Intake  Outcome: Met  Goal: Optimal Sensory Perceptual Status  Outcome: Met  Goal: Maintains Intimacy/Sexual Expression  Outcome: Met  Goal: Effective Spasticity Management  Outcome: Met  Goal: Safe and Effective Swallow  Outcome: Met  Goal: Effective Urinary Elimination/Continence  Outcome: Met     Problem: Diabetes Comorbidity  Goal: Blood Glucose Level Within Targeted Range  Outcome: Met

## 2025-05-02 NOTE — ASSESSMENT & PLAN NOTE
Patient's blood pressure range in the last 24 hours was: BP  Min: 155/75  Max: 157/81.The patient's inpatient anti-hypertensive regimen is listed below:  Current Antihypertensives  amLODIPine tablet 10 mg, Daily, Oral  amlodipine (NORVASC) tablet, Daily, Oral    Plan  - BP is uncontrolled, will adjust as follows: maybe add extra diuertic   -

## 2025-05-02 NOTE — PLAN OF CARE
Problem: Occupational Therapy  Goal: Occupational Therapy Goal  Description: STG:  Pt will perform grooming with setup MET  Pt will bathe with I after set up MET  Pt will perform UE dressing with I MET  Pt will perform LE dressing with I MET  Pt will sit EOB x 15 min with no assistance MET  Pt will transfer bed/chair/bsc with Mod I MET  Pt will perform standing task x 15 min with supervision assistance MET  Pt will tolerate 45 minutes of tx without fatigue MET      LT.Restore to max I with self care and mobility. MET    Outcome: Met

## 2025-05-02 NOTE — PT/OT/SLP PROGRESS
"Physical Therapy Treatment    Patient Name:  Jono Keita   MRN:  [id number]    Recommendations:     Discharge Recommendations: Moderate Intensity Therapy  Discharge Equipment Recommendations: walker, rolling, cane, quad  Barriers to discharge: None    Assessment:     Jono Keita is a 64 y.o. male admitted with a medical diagnosis of Debility.  He presents with the following impairments/functional limitations: impaired endurance, gait instability, impaired functional mobility Pt was able to perform 10 sit to stands with no breaks.    Rehab Prognosis: Good; patient would benefit from acute skilled PT services to address these deficits and reach maximum level of function.    Recent Surgery: * No surgery found *      Plan:     During this hospitalization, patient to be seen 5 x/week to address the identified rehab impairments via therapeutic activities, gait training, therapeutic exercises, neuromuscular re-education, wheelchair management/training and progress toward the following goals:    Plan of Care Expires:  05/23/25    Subjective     Chief Complaint: none  Patient/Family Comments/goals: return to home  Pain/Comfort:  Pain Rating 1: 0/10  Pain Rating Post-Intervention 1: 0/10      Objective:     Communicated with nursing prior to session.  Patient found up in chair with chair check upon PT entry to room.     General Precautions: Standard, aspiration  Orthopedic Precautions: N/A  Braces: N/A  Respiratory Status: Room air     Functional Mobility:  Transfers:     Sit to Stand:  stand by assistance with rolling walker  Gait: Pt was able to ambulate 200 ft with a RW and SBA, pt was able to ambulate 300 ft with a quad cane and CGA  Stairs:  Pt ascended/descended 6" curb step with Grab bars with bilateral handrails with Contact Guard Assistance.       AM-PAC 6 CLICK MOBILITY  Turning over in bed (including adjusting bedclothes, sheets and blankets)?: 4  Sitting down on and standing up from a chair with arms (e.g., " wheelchair, bedside commode, etc.): 4  Moving from lying on back to sitting on the side of the bed?: 4  Moving to and from a bed to a chair (including a wheelchair)?: 4  Need to walk in hospital room?: 3  Climbing 3-5 steps with a railing?: 3  Basic Mobility Total Score: 22       Treatment & Education:    Transfer and Gait listed above  Nu step x 6 min, standing marches, standing heel raises, sit to stadn x 10, stair training x 5     Patient left up in chair with call button in reach..    GOALS:   Multidisciplinary Problems       Physical Therapy Goals          Problem: Physical Therapy    Goal Priority Disciplines Outcome Interventions   Physical Therapy Goal     PT, PT/OT     Description: Short Term Goals (1 Week)  Patient will be modified independent with sit to stand with use of RW.     Long Term Goals (4 Weeks)  Patient will need CGA with gait with straight cane  x 350 feet on level surfaces.  Patient will be independent with commode transfer.  Patient will need SBA with negotiating 4 steps with rail .                        DME Justifications:      Time Tracking:     PT Received On: 05/02/25  PT Start Time: 1027     PT Stop Time: 1050  PT Total Time (min): 23 min     Billable Minutes: Gait Training 10 and Therapeutic Activity 13    Treatment Type: Treatment  PT/PTA: PTA     Number of PTA visits since last PT visit: 5     05/02/2025

## 2025-05-09 ENCOUNTER — CLINICAL SUPPORT (OUTPATIENT)
Dept: REHABILITATION | Facility: HOSPITAL | Age: 65
End: 2025-05-09
Payer: MEDICARE

## 2025-05-09 VITALS — DIASTOLIC BLOOD PRESSURE: 64 MMHG | SYSTOLIC BLOOD PRESSURE: 124 MMHG | HEART RATE: 80 BPM

## 2025-05-09 DIAGNOSIS — Z86.73 HISTORY OF CVA (CEREBROVASCULAR ACCIDENT): Primary | ICD-10-CM

## 2025-05-09 PROCEDURE — 97162 PT EVAL MOD COMPLEX 30 MIN: CPT

## 2025-05-09 NOTE — PROGRESS NOTES
Outpatient Rehab    Physical Therapy Evaluation    Patient Name: Jono Keita  MRN: [id number]  YOB: 1960  Encounter Date: 5/9/2025    Therapy Diagnosis:   Encounter Diagnosis   Name Primary?    History of CVA (cerebrovascular accident) Yes     Physician: Rico Braden DO    Physician Orders: Eval and Treat  Medical Diagnosis: Debility    Visit # / Visits Authorized:  1 / 1  Insurance Authorization Period: 5/2/2025 to 5/2/2026  Date of Evaluation: 5/9/2025  Plan of Care Certification: 5/9/2025 to 7/04/2025     Time In: 0930   Time Out: 1015  Total Time (in minutes): 45   Total Billable Time (in minutes): 45    Intake Outcome Measure for FOTO Survey    Therapist reviewed FOTO scores for Jono Keita on 5/9/2025.   FOTO report - see Media section or FOTO account episode details.     Intake Score: 42%         Subjective   History of Present Illness  Jono is a 64 y.o. male who reports to physical therapy with a chief concern of Difficulty with balance and gait.     The patient reports a medical diagnosis of Cerbrovascular Accident.    Diagnostic tests related to this condition: MRI studies and CT scan.             Activities of Daily Living  Social history was obtained from Patient and Family.          Patient Responsibilities: Community mobility    Previously independent with activities of daily living? Yes     Currently independent with activities of daily living? Yes          Previously independent with instrumental activities of daily living? Yes     Currently independent with instrumental activities of daily living? No  Activities currently needing assistance include: Driving, Community mobility, Meal prep, Medication management, and Grocery/shopping.            Pain     Patient reports a current pain level of 2/10. Pain at best is reported as 0/10. Pain at worst is reported as 4/10.   Clinical Progression (since onset): Stable  Pain Qualities: Aching  Pain-Relieving Factors: Rest         Review  of Systems  Patient reports: Dizziness, Headache, Lower Extremity Neurological Deficits, Shortness of Breath, Cardiac History, Diabetes, Balance Loss, and Weakness        Treatment History  Treatments  Discharged From Past 30 Days: Skilled nursing facility    Living Arrangements  Living Situation  Housing: Home with care/services  Type of Care/Services Provided at Home: Family care  Living Arrangements: Family members    Home Setup  Primary Bedroom: 1st floor  Primary Bathroom: 1st floor        Employment  Employment Status: Retired          Past Medical History/Physical Systems Review:   Jono Keita  has no past medical history on file.    Jono Keita  has a past surgical history that includes Back surgery and Knee surgery.    Jono has a current medication list which includes the following prescription(s): amlodipine, apixaban, atorvastatin, dapagliflozin propanediol, fluticasone propionate, gabapentin, and tamsulosin.    Review of patient's allergies indicates:  No Known Allergies     Objective   Vital Signs  /64   Pulse 80                         Hip Strength - Planes of Motion   Right Strength Right Pain Left Strength Left  Pain   Flexion (L2) 4-   4+     Extension     4+     ABduction 3   4+     ADduction     4+     Internal Rotation     4+     External Rotation     4+         Ankle/Foot Strength - Planes of Motion   Right Strength Right Pain Left Strength Left  Pain   Dorsiflexion (L4) 3   3+     Plantar Flexion (S1) 2+   3+     Inversion           Eversion           Great Toe Flexion           Great Toe Extension (L5)           Lesser Toes Flexion           Lesser Toes Extension                  Transfers Assessment  Sit to Stand Assistance: Independent  Chair to Bed Assistance: Independent  Bed to Chair Assistance: Independent  Toilet/Commode Transfer Assistance: Independent  Bathtub/Shower Transfer Assistance: Setup/cleanup  Car Transfer Assistance: Independent      Fall Risk  Functional mobility  test results suggest the patient is: At Risk for Falls  Four Stage Balance Test  Narrow Base of Support: 10 sec sec  Tandem Stand - Right Foot in Front: 3 sec  Tandem Stand - Left Foot in Front: 1 sec  Semi-Tandem Stand - Right Foot in Front: 3 sec  Semi-Tandem Stand - Left Foot in Front: 1 sec  Single Leg Stand - Right Foot: 0 sec  Single Leg Stand - Left Foot: 0 sec       Timed Up & Go (TUG)  Time: 13.5 seconds  Observations: Short strides, Shuffling, and Loss of balance  An older adult who takes >=12 seconds to complete the TUG is at risk for falling.           Gait Analysis  Base of Support: Narrow  Gait Pattern: Scissoring, Spastic, Ataxic                   Treatment:       Time Entry(in minutes):  PT Evaluation (Moderate) Time Entry: 45    Assessment & Plan   Assessment  Jono presents with a condition of Moderate complexity.           Functional Limitations: Activity tolerance, Ambulating on uneven surfaces, Bed mobility, Increased risk of fall, Maintaining balance, Manipulating objects, Transfers, Squatting  Impairments: Abnormal coordination, Abnormal gait, Activity intolerance  Personal Factors Affecting Prognosis: Physical limitations    Prognosis: Good  Prognosis Details: Patient has decreased mobility and gait due to recent CVA. Patient is independent with gait with RW on level surfaces. Patient was independent with gait in the community with no AD prior to recent CVA. PT will continue to follow for gait , balance , strength , and transfer training.     Plan  From a physical therapy perspective, the patient would benefit from: Skilled Rehab Services    Planned therapy interventions include: Therapeutic exercise, Therapeutic activities, Neuromuscular re-education, Manual therapy, and Gait training.    Planned modalities to include: Electrical stimulation - passive/unattended.        Visit Frequency: 2 times Per Week for 8 Weeks.       This plan was discussed with Patient and Caregiver.   Discussion  participants: Agreed Upon Plan of Care  Plan details: Patient will report to PT 2 x per week for ROM, strength, balance, gait and functional mobility training in order to return to prior level of function          Patient's spiritual, cultural, and educational needs considered and patient agreeable to plan of care and goals.       Case conference performed with Jen Hoffman PTA regarding patient plan of care.     Goals:   Active       Long Term Goals       Patient will be able to walk on level surface with no AD independently x 250 feet.        Start:  05/09/25    Expected End:  07/04/25            Patient will demonstrate 5/5 RLE hip ABD       Start:  05/09/25    Expected End:  07/04/25            Patient will be able to perform tandem stance  x 10 sec       Start:  05/09/25    Expected End:  07/04/25               Short Term Goals       Patient will be independent with HEP       Start:  05/09/25    Expected End:  05/16/25                Jimy Tapia PT        Physician signature__________________________   Date___________________________         Clinical Information for Insurance Authorization     Dates of Services: 5/02/2025 to 7/04/2025  Discharge Plan: Patient will be discharged from skilled physical therapy treatment once all goals have been met, patient has plateaued, or physician/insurance requests discontinuation of care. Patient will be discharged with a home exercise program.   Type of therapy: Neuro Rehabilitative  ICD-10 Diagnosis Code(s): I69.351  Which side is symptomatic? Right  Surgical: No  Surgical procedure: N/A  Surgery date: N/A.  Presenting symptoms/diagnoses are unspecified in nature.  Presenting symptoms are radiating in nature.   The rehabilitation is not related to a diagnosis of cancer.  The rehabilitation is not related to a diagnosis of lymphedema.  Patient's clinical presentation is:  Moderate objective and functional deficits: consistent symptoms and/or symptoms that are  intensified with activity with moderate loss of range of motion, strength, or ability to perform daily tasks  CPT Codes Requested:  50717 [therapeutic exercise], 53495 [neuromuscular re-education], 98185 [gait training], 07038 [manual therapy], 97232 [therapeutic activities], and 81294 [unattended electrical stimulation]

## 2025-05-14 ENCOUNTER — CLINICAL SUPPORT (OUTPATIENT)
Dept: REHABILITATION | Facility: HOSPITAL | Age: 65
End: 2025-05-14
Payer: MEDICARE

## 2025-05-14 DIAGNOSIS — Z86.73 HISTORY OF CVA (CEREBROVASCULAR ACCIDENT): Primary | ICD-10-CM

## 2025-05-14 PROCEDURE — 97116 GAIT TRAINING THERAPY: CPT

## 2025-05-14 PROCEDURE — 97110 THERAPEUTIC EXERCISES: CPT

## 2025-05-14 PROCEDURE — 97530 THERAPEUTIC ACTIVITIES: CPT

## 2025-05-16 ENCOUNTER — CLINICAL SUPPORT (OUTPATIENT)
Dept: REHABILITATION | Facility: HOSPITAL | Age: 65
End: 2025-05-16
Payer: MEDICARE

## 2025-05-16 DIAGNOSIS — Z86.73 HISTORY OF CVA (CEREBROVASCULAR ACCIDENT): Primary | ICD-10-CM

## 2025-05-16 PROCEDURE — 97116 GAIT TRAINING THERAPY: CPT

## 2025-05-16 PROCEDURE — 97110 THERAPEUTIC EXERCISES: CPT

## 2025-05-16 PROCEDURE — 97530 THERAPEUTIC ACTIVITIES: CPT

## 2025-05-16 NOTE — PROGRESS NOTES
Outpatient Rehab    Physical Therapy Visit    Patient Name: Jono Keita  MRN: [id number]  YOB: 1960  Encounter Date: 5/16/2025    Therapy Diagnosis:   Encounter Diagnosis   Name Primary?    History of CVA (cerebrovascular accident) Yes     Physician: Rico Braden DO    Physician Orders: Eval and Treat  Medical Diagnosis: Debility    Visit # / Visits Authorized:  2 / 16  Insurance Authorization Period: 5/9/2025 to 7/4/2025  Date of Evaluation: 5/09/2025  Plan of Care Certification:  5/9/2025 to 7/4/2025     PT/PTA:     Number of PTA visits since last PT visit:   Time In: 0930   Time Out: 1015  Total Time (in minutes): 45   Total Billable Time (in minutes): 45    FOTO:  Intake Score: 42%  Survey Score 2:  %  Survey Score 3:  %    Precautions:       Subjective   Patient states he wants to walk with a cane..  Pain reported as 3/10. R hand pain    Objective            Treatment:  Therapeutic Exercise  TE 1: Patient performed Nu step x 8 minutes for reciprocal movement and LE mobility  TE 2: skc, HS, piriformis ,gastroc stretch 3 x 30  Therapeutic Activity  TA 1: Step ups , squats,2 x 10, hip flexion, hip ABD 2x10 with 3#.  Gait Training  GT 1: Gait training with straight  cane with emphasis on reciprocal movement and proper gait sequencing  2 x 200 feet with RW.    Time Entry(in minutes):  Gait Training Time Entry: 10  Therapeutic Activity Time Entry: 15  Therapeutic Exercise Time Entry: 20    Assessment & Plan   Assessment: Patient responded well to gait training with emphasis on reciprocal movement and proper gait sequence.       Patient will continue to benefit from skilled outpatient physical therapy to address the deficits listed in the problem list box on initial evaluation, provide pt/family education and to maximize pt's level of independence in the home and community environment.     Patient's spiritual, cultural, and educational needs considered and patient agreeable to plan of care and  goals.       Case conference performed with Jen Hoffman PTA regarding patient plan of care.     Plan: Continue PT as per POC    Goals:     Jimy Tapia, PT

## 2025-05-21 ENCOUNTER — CLINICAL SUPPORT (OUTPATIENT)
Dept: REHABILITATION | Facility: HOSPITAL | Age: 65
End: 2025-05-21
Payer: MEDICARE

## 2025-05-21 DIAGNOSIS — Z86.73 HISTORY OF CVA (CEREBROVASCULAR ACCIDENT): Primary | ICD-10-CM

## 2025-05-21 PROCEDURE — 97110 THERAPEUTIC EXERCISES: CPT | Mod: CQ

## 2025-05-21 PROCEDURE — 97530 THERAPEUTIC ACTIVITIES: CPT | Mod: CQ

## 2025-05-21 NOTE — PROGRESS NOTES
Outpatient Rehab    Physical Therapy Visit    Patient Name: Jono Keita  MRN: [id number]  YOB: 1960  Encounter Date: 5/21/2025    Therapy Diagnosis:   Encounter Diagnosis   Name Primary?    History of CVA (cerebrovascular accident) Yes     Physician: Rico Braden DO    Physician Orders: Eval and Treat  Medical Diagnosis: Debility    Visit # / Visits Authorized:  3 / 16  Insurance Authorization Period: 5/9/2025 to 7/4/2025  Date of Evaluation: 5/9/2025  Plan of Care Certification: 5/9/2025 to 7/4/2025      PT/PTA: PTA   Number of PTA visits since last PT visit:1  Time In: 0930   Time Out: 1013  Total Time (in minutes): 43   Total Billable Time (in minutes): 43    FOTO:  Intake Score: 42%  Survey Score 2:  %  Survey Score 3:  %    Precautions:       Subjective   patient states he is sore today.  Pain reported as 4/10.      Objective            Treatment:  Therapeutic Exercise  TE 1: Patient performed Nu step x 8 minutes for reciprocal movement and LE mobility  TE 2: skc, HS, piriformis ,gastroc stretch 3 x 30  Therapeutic Activity  TA 1: Step ups , squats,2 x 10, hip flexion, hip ABD 2x10 with 3#.    Time Entry(in minutes):  Therapeutic Activity Time Entry: 23  Therapeutic Exercise Time Entry: 20    Assessment & Plan   Assessment: patient showed improved ambulation with straight cane       Patient will continue to benefit from skilled outpatient physical therapy to address the deficits listed in the problem list box on initial evaluation, provide pt/family education and to maximize pt's level of independence in the home and community environment.     Patient's spiritual, cultural, and educational needs considered and patient agreeable to plan of care and goals.           Plan: Continue PT as per POC    Goals:   Active       Long Term Goals       Patient will be able to walk on level surface with no AD independently x 250 feet.        Start:  05/09/25    Expected End:  07/04/25            Patient  will demonstrate 5/5 RLE hip ABD       Start:  05/09/25    Expected End:  07/04/25            Patient will be able to perform tandem stance  x 10 sec       Start:  05/09/25    Expected End:  07/04/25               Short Term Goals       Patient will be independent with HEP       Start:  05/09/25    Expected End:  05/16/25                Jen Hoffman, PTA

## 2025-05-23 ENCOUNTER — CLINICAL SUPPORT (OUTPATIENT)
Dept: REHABILITATION | Facility: HOSPITAL | Age: 65
End: 2025-05-23
Payer: MEDICARE

## 2025-05-23 DIAGNOSIS — Z86.73 HISTORY OF CVA (CEREBROVASCULAR ACCIDENT): Primary | ICD-10-CM

## 2025-05-23 PROCEDURE — 97116 GAIT TRAINING THERAPY: CPT

## 2025-05-23 PROCEDURE — 97110 THERAPEUTIC EXERCISES: CPT

## 2025-05-23 PROCEDURE — 97530 THERAPEUTIC ACTIVITIES: CPT

## 2025-05-23 NOTE — PROGRESS NOTES
Outpatient Rehab    Physical Therapy Visit    Patient Name: Jono Keita  MRN: [id number]  YOB: 1960  Encounter Date: 5/23/2025    Therapy Diagnosis:   Encounter Diagnosis   Name Primary?    History of CVA (cerebrovascular accident) Yes     Physician: Rico Braden DO    Physician Orders: Eval and Treat  Medical Diagnosis: Debility    Visit # / Visits Authorized:  4 / 16  Insurance Authorization Period: 5/9/2025 to 7/4/2025  Date of Evaluation: 5/09/2025  Plan of Care Certification:  5/9/2025 to 7/4/2025     PT/PTA:     Number of PTA visits since last PT visit:   Time In: 1230   Time Out: 1315  Total Time (in minutes): 45   Total Billable Time (in minutes): 45    FOTO:  Intake Score: 42%  Survey Score 2:  %  Survey Score 3:  %    Precautions:       Subjective   Patient states his shoulder and hand is sore..  Pain reported as 4/10. R hand pain    Objective            Treatment:  Therapeutic Exercise  TE 1: Patient performed Nu step x 8 minutes for reciprocal movement and LE mobility  TE 2: skc, HS, piriformis ,gastroc stretch 3 x 30  Therapeutic Activity  TA 1: Step ups , squats,2 x 10, hip flexion, hip ABD 2x10 with 3#.  Gait Training  GT 1: Gait training with straight  cane with emphasis on reciprocal movement and proper gait sequencing  2 x 200 feet with RW.    Time Entry(in minutes):  Gait Training Time Entry: 10  Therapeutic Activity Time Entry: 20  Therapeutic Exercise Time Entry: 15    Assessment & Plan   Assessment: patient showed improved ambulation with straight cane       The patient will continue to benefit from skilled outpatient physical therapy in order to address the deficits listed in the problem list on the initial evaluation, provide patient and family education, and maximize the patients level of independence in the home and community environments.     The patient's spiritual, cultural, and educational needs were considered, and the patient is agreeable to the plan of care and  goals.       Case conference performed with Jen Hoffman PTA regarding patient plan of care.     Plan: Continue PT as per POC    Goals:     Jimy Tapia, PT

## 2025-05-28 ENCOUNTER — CLINICAL SUPPORT (OUTPATIENT)
Dept: REHABILITATION | Facility: HOSPITAL | Age: 65
End: 2025-05-28
Payer: MEDICARE

## 2025-05-28 DIAGNOSIS — Z86.73 HISTORY OF CVA (CEREBROVASCULAR ACCIDENT): Primary | ICD-10-CM

## 2025-05-28 NOTE — PROGRESS NOTES
Patient was unable to participate with PT treatment due to feeling poorly on arrival. Patient's wife was called to come and get him and she came to pick him up. Patient was escorted to car in . Patient was instructed to report to his M.D..

## 2025-06-04 ENCOUNTER — CLINICAL SUPPORT (OUTPATIENT)
Dept: REHABILITATION | Facility: HOSPITAL | Age: 65
End: 2025-06-04
Payer: MEDICARE

## 2025-06-04 DIAGNOSIS — Z86.73 HISTORY OF CVA (CEREBROVASCULAR ACCIDENT): Primary | ICD-10-CM

## 2025-06-04 PROCEDURE — 97110 THERAPEUTIC EXERCISES: CPT | Mod: CQ

## 2025-06-04 PROCEDURE — 97112 NEUROMUSCULAR REEDUCATION: CPT | Mod: CQ

## 2025-06-06 ENCOUNTER — CLINICAL SUPPORT (OUTPATIENT)
Dept: REHABILITATION | Facility: HOSPITAL | Age: 65
End: 2025-06-06
Payer: MEDICARE

## 2025-06-06 DIAGNOSIS — Z86.73 HISTORY OF CVA (CEREBROVASCULAR ACCIDENT): Primary | ICD-10-CM

## 2025-06-06 PROCEDURE — 97116 GAIT TRAINING THERAPY: CPT

## 2025-06-06 PROCEDURE — 97140 MANUAL THERAPY 1/> REGIONS: CPT

## 2025-06-06 PROCEDURE — 97112 NEUROMUSCULAR REEDUCATION: CPT

## 2025-06-06 PROCEDURE — 97110 THERAPEUTIC EXERCISES: CPT

## 2025-06-11 ENCOUNTER — CLINICAL SUPPORT (OUTPATIENT)
Dept: REHABILITATION | Facility: HOSPITAL | Age: 65
End: 2025-06-11
Payer: MEDICARE

## 2025-06-11 DIAGNOSIS — Z86.73 HISTORY OF CVA (CEREBROVASCULAR ACCIDENT): Primary | ICD-10-CM

## 2025-06-11 PROCEDURE — 97110 THERAPEUTIC EXERCISES: CPT | Mod: CQ

## 2025-06-11 PROCEDURE — 97112 NEUROMUSCULAR REEDUCATION: CPT | Mod: CQ

## 2025-06-11 NOTE — PROGRESS NOTES
Outpatient Rehab    Physical Therapy Visit    Patient Name: Jono Keita  MRN: [id number]  YOB: 1960  Encounter Date: 6/11/2025    Therapy Diagnosis:   Encounter Diagnosis   Name Primary?    History of CVA (cerebrovascular accident) Yes     Physician: Rico Braden DO    Physician Orders: Eval and Treat  Medical Diagnosis: Debility  Surgical Diagnosis: Not applicable for this Episode   Surgical Date: Not applicable for this Episode    Visit # / Visits Authorized:  8 / 16  Insurance Authorization Period: 5/9/2025 to 7/4/2025  Date of Evaluation: 5/9/2025  Plan of Care Certification: 5/9/2025 to 7/4/2025      PT/PTA: PTA   Number of PTA visits since last PT visit:2  Time In: 0940   Time Out: 1015  Total Time (in minutes): 35   Total Billable Time (in minutes): 35    FOTO:  Intake Score: 42%  Survey Score 2: 61%  Survey Score 3:  %    Precautions:       Subjective   patient states that his right shoulder and hip are hurting today.  Pain reported as 6/10.      Objective            Treatment:  Therapeutic Exercise  TE 1: Patient performed Nu step x 8 minutes for reciprocal movement and LE mobility  TE 2: ,gastroc stretch 3 min on wedge  Therapeutic Activity  TA 1: Step ups x 10, hip flexion, hip ABD, hip extension 3x10 with 2#  TA 2: standing heel raises x 10, standing toe raises x 10, seated ankle rom on round board x 10  Gait Training  GT 1: Gait training with straight  cane with emphasis on reciprocal movement and proper gait sequencing  2 x 200 feet .    Time Entry(in minutes):  Neuromuscular Re-Education Time Entry: 15  Therapeutic Activity Time Entry: 10  Therapeutic Exercise Time Entry: 10    Assessment & Plan   Assessment: patient had improved endurance today during gait training, pt was able to perform ankle exercises       The patient will continue to benefit from skilled outpatient physical therapy in order to address the deficits listed in the problem list on the initial evaluation, provide  patient and family education, and maximize the patients level of independence in the home and community environments.     The patient's spiritual, cultural, and educational needs were considered, and the patient is agreeable to the plan of care and goals.           Plan: Continue PT as per POC    Goals:   Active       Long Term Goals       Patient will be able to walk on level surface with no AD independently x 250 feet.  (Progressing)       Start:  05/09/25    Expected End:  07/04/25            Patient will demonstrate 5/5 RLE hip ABD (Progressing)       Start:  05/09/25    Expected End:  07/04/25            Patient will be able to perform tandem stance  x 10 sec (Progressing)       Start:  05/09/25    Expected End:  07/04/25              Resolved       Short Term Goals       Patient will be independent with HEP (Met)       Start:  05/09/25    Expected End:  05/16/25    Resolved:  06/06/25             Jen Hoffman PTA

## 2025-06-13 ENCOUNTER — CLINICAL SUPPORT (OUTPATIENT)
Dept: REHABILITATION | Facility: HOSPITAL | Age: 65
End: 2025-06-13
Payer: MEDICARE

## 2025-06-13 DIAGNOSIS — Z86.73 HISTORY OF CVA (CEREBROVASCULAR ACCIDENT): Primary | ICD-10-CM

## 2025-06-13 PROCEDURE — 97530 THERAPEUTIC ACTIVITIES: CPT | Mod: CQ

## 2025-06-13 PROCEDURE — 97116 GAIT TRAINING THERAPY: CPT | Mod: CQ

## 2025-06-13 PROCEDURE — 97112 NEUROMUSCULAR REEDUCATION: CPT | Mod: CQ

## 2025-06-13 NOTE — PROGRESS NOTES
Outpatient Rehab    Physical Therapy Visit    Patient Name: Jono Keita  MRN: [id number]  YOB: 1960  Encounter Date: 6/13/2025    Therapy Diagnosis:   Encounter Diagnosis   Name Primary?    History of CVA (cerebrovascular accident) Yes     Physician: Rico Braden DO    Physician Orders: Eval and Treat  Medical Diagnosis: Debility  Surgical Diagnosis: Not applicable for this Episode   Surgical Date: Not applicable for this Episode    Visit # / Visits Authorized:  9 / 16  Insurance Authorization Period: 5/9/2025 to 7/4/2025  Date of Evaluation: 5/9/2025  Plan of Care Certification: 5/9/2025 to 7/4/2025      PT/PTA: PTA   Number of PTA visits since last PT visit:3  Time In: 0935   Time Out: 1013  Total Time (in minutes): 38   Total Billable Time (in minutes): 38    FOTO:  Intake Score: 42%  Survey Score 2: 61%  Survey Score 3:  %    Precautions:       Subjective   patient states that he is feeling good today and he was able to drive himself today.  Pain reported as 4/10.      Objective            Treatment:  Therapeutic Exercise  TE 1: Patient performed Nu step x 10 minutes for reciprocal movement and LE mobility  TE 2: ,gastroc stretch 3 min on wedge  Balance/Neuromuscular Re-Education  NMR 1: side stepping , Side stepping/squatting ,tandem walk,  Therapeutic Activity  TA 1: Step ups x 10, hip flexion, hip ABD, hip extension 3x10 with 2#  TA 2: seated ankle rom on round board x 10, standing heel raises x 10, standing toe raises x 1-  Gait Training  GT 1: gait training x 200 ft without AD with emphasis on picking up his right toe during ambulation    Time Entry(in minutes):  Gait Training Time Entry: 10  Neuromuscular Re-Education Time Entry: 10  Therapeutic Activity Time Entry: 8  Therapeutic Exercise Time Entry: 10    Assessment & Plan   Assessment: patient was able to perform gait training x 200 ft without an AD       The patient will continue to benefit from skilled outpatient physical therapy  in order to address the deficits listed in the problem list on the initial evaluation, provide patient and family education, and maximize the patients level of independence in the home and community environments.     The patient's spiritual, cultural, and educational needs were considered, and the patient is agreeable to the plan of care and goals.           Plan: Continue PT as per POC    Goals:   Active       Long Term Goals       Patient will be able to walk on level surface with no AD independently x 250 feet.  (Progressing)       Start:  05/09/25    Expected End:  07/04/25            Patient will demonstrate 5/5 RLE hip ABD (Progressing)       Start:  05/09/25    Expected End:  07/04/25            Patient will be able to perform tandem stance  x 10 sec (Progressing)       Start:  05/09/25    Expected End:  07/04/25              Resolved       Short Term Goals       Patient will be independent with HEP (Met)       Start:  05/09/25    Expected End:  05/16/25    Resolved:  06/06/25             Jen Hoffman PTA

## 2025-06-18 ENCOUNTER — CLINICAL SUPPORT (OUTPATIENT)
Dept: REHABILITATION | Facility: HOSPITAL | Age: 65
End: 2025-06-18
Payer: MEDICARE

## 2025-06-18 DIAGNOSIS — Z86.73 HISTORY OF CVA (CEREBROVASCULAR ACCIDENT): Primary | ICD-10-CM

## 2025-06-18 PROCEDURE — 97110 THERAPEUTIC EXERCISES: CPT | Mod: CQ

## 2025-06-18 NOTE — PROGRESS NOTES
Outpatient Rehab    Physical Therapy Visit    Patient Name: Jono Keita  MRN: [id number]  YOB: 1960  Encounter Date: 6/18/2025    Therapy Diagnosis:   Encounter Diagnosis   Name Primary?    History of CVA (cerebrovascular accident) Yes     Physician: Rico Braden DO    Physician Orders: Eval and Treat  Medical Diagnosis: Debility  Surgical Diagnosis: Not applicable for this Episode   Surgical Date: Not applicable for this Episode  Days Since Last Surgery: Not applicable for this Episode    Visit # / Visits Authorized:  10 / 16  Insurance Authorization Period: 5/9/2025 to 7/4/2025  Date of Evaluation: 5/9/2025  Plan of Care Certification: 5/9/2025 to 7/4/2025      PT/PTA: PTA   Number of PTA visits since last PT visit:4  Time In: 0945   Time Out: 1015  Total Time (in minutes): 30   Total Billable Time (in minutes):      FOTO:  Intake Score: 42%  Survey Score 2:  %  Survey Score 3:  %    Precautions:       Subjective   patient stated that his sugar was low this morning so he needed to eat a snack to bring it back up.  Pain reported as 0/10.      Objective            Treatment:  Therapeutic Exercise  TE 1: Patient performed Nu step x 6 minutes for reciprocal movement and LE mobility  TE 2: seated LAQs x 10      Time Entry(in minutes):  Therapeutic Exercise Time Entry: 10    Assessment & Plan   Assessment: Patient did not take his medicine this morning and his sugar was high. Patient did not feel well and was not able to finish exercises as usual. Patient stated that his bad eye did not feel right.       The patient will continue to benefit from skilled outpatient physical therapy in order to address the deficits listed in the problem list on the initial evaluation, provide patient and family education, and maximize the patients level of independence in the home and community environments.     The patient's spiritual, cultural, and educational needs were considered, and the patient is agreeable to  the plan of care and goals.           Plan: Continue PT as per POC    Goals:   Active       Long Term Goals       Patient will be able to walk on level surface with no AD independently x 250 feet.  (Progressing)       Start:  05/09/25    Expected End:  07/04/25            Patient will demonstrate 5/5 RLE hip ABD (Progressing)       Start:  05/09/25    Expected End:  07/04/25            Patient will be able to perform tandem stance  x 10 sec (Progressing)       Start:  05/09/25    Expected End:  07/04/25              Resolved       Short Term Goals       Patient will be independent with HEP (Met)       Start:  05/09/25    Expected End:  05/16/25    Resolved:  06/06/25             Jen Hoffman PTA

## 2025-06-20 ENCOUNTER — CLINICAL SUPPORT (OUTPATIENT)
Dept: REHABILITATION | Facility: HOSPITAL | Age: 65
End: 2025-06-20
Payer: MEDICARE

## 2025-06-20 VITALS — DIASTOLIC BLOOD PRESSURE: 72 MMHG | HEART RATE: 92 BPM | SYSTOLIC BLOOD PRESSURE: 124 MMHG

## 2025-06-20 DIAGNOSIS — R53.81 DEBILITY: Primary | ICD-10-CM

## 2025-06-20 PROCEDURE — 97110 THERAPEUTIC EXERCISES: CPT

## 2025-06-20 PROCEDURE — 97530 THERAPEUTIC ACTIVITIES: CPT

## 2025-06-20 NOTE — PROGRESS NOTES
Outpatient Rehab    Physical Therapy Visit    Patient Name: Jono Keita  MRN: [id number]  YOB: 1960  Encounter Date: 6/20/2025    Therapy Diagnosis:   Encounter Diagnosis   Name Primary?    Debility Yes     Physician: Rico Braden DO    Physician Orders: Eval and Treat  Medical Diagnosis: Debility  Surgical Diagnosis: Not applicable for this Episode   Surgical Date: Not applicable for this Episode  Days Since Last Surgery: Not applicable for this Episode    Visit # / Visits Authorized:  11 / 16  Insurance Authorization Period: 5/9/2025 to 7/4/2025  Date of Evaluation: 5/9/2025  Plan of Care Certification: 5/9/2025 to 7/4/2025      PT/PTA:     Number of PTA visits since last PT visit:   Time In: 0940   Time Out: 1030  Total Time (in minutes): 50   Total Billable Time (in minutes): 45    FOTO:  Intake Score:  %  Survey Score 2:  %  Survey Score 3:  %    Precautions:       Subjective   Patient is feeling better this morning. He did not check his blood sugar but reports he's feeling good and took his diabetes medication..  Pain reported as 0/10.      Objective   Vital Signs  /72   Pulse 92   BP Location: Right arm  BP Position: Sitting                  Treatment:  Therapeutic Exercise  TE 1: Patient performed Nu step x 6 minutes for reciprocal movement and LE mobility  TE 2: Brides x20  TE 3: Clamshells x20 ea  TE 4: Hip flexor str 3x30  Balance/Neuromuscular Re-Education  NMR 1: Tandem walk fwd and bkwrd x3  Therapeutic Activity  TA 1: Step ups x20 ea side  TA 2: Standing hip flexion x20 ea  TA 3: Side stepping one UE assist (needs verbal instructions to look forward)    Time Entry(in minutes):  Therapeutic Activity Time Entry: 20  Therapeutic Exercise Time Entry: 25    Assessment & Plan   Assessment: Patient tolerated tx fairly well with frequent rest breaks needed after standing exercises.        The patient will continue to benefit from skilled outpatient physical therapy in order to  address the deficits listed in the problem list on the initial evaluation, provide patient and family education, and maximize the patients level of independence in the home and community environments.     The patient's spiritual, cultural, and educational needs were considered, and the patient is agreeable to the plan of care and goals.       Case conference performed with Jen Hoffman PTA regarding patient plan of care.     Plan: Continue PT as per POC. Continue to focus on endurance and strength to improve functional mobility potential.    Goals:   Active       Long Term Goals       Patient will be able to walk on level surface with no AD independently x 250 feet.  (Progressing)       Start:  05/09/25    Expected End:  07/04/25            Patient will demonstrate 5/5 RLE hip ABD (Progressing)       Start:  05/09/25    Expected End:  07/04/25            Patient will be able to perform tandem stance  x 10 sec (Progressing)       Start:  05/09/25    Expected End:  07/04/25              Resolved       Short Term Goals       Patient will be independent with HEP (Met)       Start:  05/09/25    Expected End:  05/16/25    Resolved:  06/06/25             Jimy Tapia, PT

## 2025-06-27 ENCOUNTER — CLINICAL SUPPORT (OUTPATIENT)
Dept: REHABILITATION | Facility: HOSPITAL | Age: 65
End: 2025-06-27
Payer: MEDICARE

## 2025-06-27 DIAGNOSIS — Z86.73 HISTORY OF CVA (CEREBROVASCULAR ACCIDENT): Primary | ICD-10-CM

## 2025-06-27 PROCEDURE — 97530 THERAPEUTIC ACTIVITIES: CPT | Mod: CQ

## 2025-06-27 PROCEDURE — 97110 THERAPEUTIC EXERCISES: CPT | Mod: CQ

## 2025-06-27 NOTE — PROGRESS NOTES
Outpatient Rehab    Physical Therapy Discharge      Expand All Collapse All     Patient Name: Jono Keita  MRN: [id number]  YOB: 1960  Encounter Date: 6/27/2025     Therapy Diagnosis:        Encounter Diagnosis   Name Primary?    History of CVA (cerebrovascular accident) Yes      Physician: Rico Braden DO     Physician Orders: Eval and Treat  Medical Diagnosis: Debility  Surgical Diagnosis: Not applicable for this Episode   Surgical Date: Not applicable for this Episode  Days Since Last Surgery: Not applicable for this Episode     Visit # / Visits Authorized:  12 / 16  Insurance Authorization Period: 5/9/2025 to 7/4/2025  Date of Evaluation: 5/9/2025  Plan of Care Certification: 5/9/2025 to 7/4/2025                 PT/PTA: PTA   Number of PTA visits since last PT visit:1  Time In: 0912   Time Out: 0952  Total Time (in minutes): 40   Total Billable Time (in minutes):       FOTO:  Intake Score:  %42  Survey Score 2:  %58  Survey Score 3:  %61     Precautions:     Subjective  patient stated that his right hand and left toes were hurting at about a 6 and that his right knee was hurting more than that.  Pain reported as 7/10.       Objective     Treatment:  Therapeutic Exercise  TE 1: Patient performed Nu step x 4 minutes for reciprocal movement and LE mobility  TE 2: seated LAQs 2 x 10 B 3 lb  TE 3: seated marches 3 x 10 B 3 lb  TE 4: seated hip abd 3 x 10 green band  TE 5: seated hamstirng curls x 10 B green band  Therapeutic Activity  TA 3: side stepping     Time Entry(in minutes):  Therapeutic Activity Time Entry: 13  Therapeutic Exercise Time Entry: 25     Assessment & Plan  Assessment: Patient stated that he went to the heart doctor and they prescribed him to wear a heart monitor for three weeks. Patient will be discharged until his heart problem has been resolved. Patient was out of breath and fatigued during therapy. Patient was unable to do as many standing exercises. Patient needed frequent  rest breaks even during seated exercises. Patient was sent home with HEP.     The patient will continue to benefit from skilled outpatient physical therapy in order to address the deficits listed in the problem list on the initial evaluation, provide patient and family education, and maximize the patients level of independence in the home and community environments.      The patient's spiritual, cultural, and educational needs were considered, and the patient is agreeable to the plan of care and goals.             Plan: DC PT due to continued c/o SOB and fatigue. Patient is currently being followed by a cardiologist. DC PT at this time   Goals:   Active         Long Term Goals         Patient will be able to walk on level surface with no AD independently x 250 feet.  (Progressing)         Start:  05/09/25    Expected End:  07/04/25              Patient will demonstrate 5/5 RLE hip ABD (Progressing)         Start:  05/09/25    Expected End:  07/04/25              Patient will be able to perform tandem stance  x 10 sec (Progressing)         Start:  05/09/25    Expected End:  07/04/25               Resolved         Short Term Goals         Patient will be independent with HEP (Met)         Start:  05/09/25    Expected End:  05/16/25    Resolved:  06/06/25

## 2025-06-27 NOTE — PROGRESS NOTES
Outpatient Rehab    Physical Therapy Visit    Patient Name: Jono Keita  MRN: [id number]  YOB: 1960  Encounter Date: 6/27/2025    Therapy Diagnosis:   Encounter Diagnosis   Name Primary?    History of CVA (cerebrovascular accident) Yes     Physician: Rico Braden DO    Physician Orders: Eval and Treat  Medical Diagnosis: Debility  Surgical Diagnosis: Not applicable for this Episode   Surgical Date: Not applicable for this Episode  Days Since Last Surgery: Not applicable for this Episode    Visit # / Visits Authorized:  12 / 16  Insurance Authorization Period: 5/9/2025 to 7/4/2025  Date of Evaluation: 5/9/2025  Plan of Care Certification: 5/9/2025 to 7/4/2025      PT/PTA: PTA   Number of PTA visits since last PT visit:1  Time In: 0912   Time Out: 0952  Total Time (in minutes): 40   Total Billable Time (in minutes):      FOTO:  Intake Score:  %  Survey Score 2:  %  Survey Score 3:  %    Precautions:       Subjective   patient stated that his right hand and left toes were hurting at about a 6 and that his right knee was hurting more than that.  Pain reported as 7/10.      Objective            Treatment:  Therapeutic Exercise  TE 1: Patient performed Nu step x 4 minutes for reciprocal movement and LE mobility  TE 2: seated LAQs 2 x 10 B 3 lb  TE 3: seated marches 3 x 10 B 3 lb  TE 4: seated hip abd 3 x 10 green band  TE 5: seated hamstirng curls x 10 B green band  Therapeutic Activity  TA 3: side stepping    Time Entry(in minutes):  Therapeutic Activity Time Entry: 13  Therapeutic Exercise Time Entry: 25    Assessment & Plan   Assessment: Patient stated that he went to the heart doctor and they prescribed him to wear a heart monitor for three weeks. Patient will be discharged until his heart problem has been resolved. Patient was out of breath and fatigued during therapy. Patient was unable to do as many standing exercises. Patient needed frequent rest breaks even during seated exercises. Patient was  sent home with HEP.       The patient will continue to benefit from skilled outpatient physical therapy in order to address the deficits listed in the problem list on the initial evaluation, provide patient and family education, and maximize the patients level of independence in the home and community environments.     The patient's spiritual, cultural, and educational needs were considered, and the patient is agreeable to the plan of care and goals.           Plan: Continue PT as per POC. Continue to focus on endurance and strength to improve functional mobility potential.    Goals:   Active       Long Term Goals       Patient will be able to walk on level surface with no AD independently x 250 feet.  (Progressing)       Start:  05/09/25    Expected End:  07/04/25            Patient will demonstrate 5/5 RLE hip ABD (Progressing)       Start:  05/09/25    Expected End:  07/04/25            Patient will be able to perform tandem stance  x 10 sec (Progressing)       Start:  05/09/25    Expected End:  07/04/25              Resolved       Short Term Goals       Patient will be independent with HEP (Met)       Start:  05/09/25    Expected End:  05/16/25    Resolved:  06/06/25             Jen Hoffman PTA

## 2025-06-27 NOTE — PROGRESS NOTES
Outpatient Rehab    Physical Therapy Discharge        Expand All Collapse All     Outpatient Rehab     Physical Therapy Visit     Patient Name: Jono Keita  MRN: [id number]  YOB: 1960  Encounter Date: 6/27/2025     Therapy Diagnosis:        Encounter Diagnosis   Name Primary?    History of CVA (cerebrovascular accident) Yes      Physician: Rico Braden DO     Physician Orders: Eval and Treat  Medical Diagnosis: Debility  Surgical Diagnosis: Not applicable for this Episode   Surgical Date: Not applicable for this Episode  Days Since Last Surgery: Not applicable for this Episode     Visit # / Visits Authorized:  12 / 16  Insurance Authorization Period: 5/9/2025 to 7/4/2025  Date of Evaluation: 5/9/2025  Plan of Care Certification: 5/9/2025 to 7/4/2025                 PT/PTA: PTA   Number of PTA visits since last PT visit:1  Time In: 0912   Time Out: 0952  Total Time (in minutes): 40   Total Billable Time (in minutes):       FOTO:  Intake Score:  %42  Survey Score 2:  %61  Survey Score 3:  %57     Precautions:     Subjective  patient stated that his right hand and left toes were hurting at about a 6 and that his right knee was hurting more than that.  Pain reported as 7/10.       Objective     Treatment:  Therapeutic Exercise  TE 1: Patient performed Nu step x 4 minutes for reciprocal movement and LE mobility  TE 2: seated LAQs 2 x 10 B 3 lb  TE 3: seated marches 3 x 10 B 3 lb  TE 4: seated hip abd 3 x 10 green band  TE 5: seated hamstirng curls x 10 B green band  Therapeutic Activity  TA 3: side stepping     Time Entry(in minutes):  Therapeutic Activity Time Entry: 13  Therapeutic Exercise Time Entry: 25     Assessment & Plan  Assessment: Patient stated that he went to the heart doctor and they prescribed him to wear a heart monitor for three weeks. Patient will be discharged until his heart problem has been resolved. Patient was out of breath and fatigued during therapy. Patient was unable to do  as many standing exercises. Patient needed frequent rest breaks even during seated exercises. Patient was sent home with HEP.     The patient will continue to benefit from skilled outpatient physical therapy in order to address the deficits listed in the problem list on the initial evaluation, provide patient and family education, and maximize the patients level of independence in the home and community environments.      The patient's spiritual, cultural, and educational needs were considered, and the patient is agreeable to the plan of care and goals.             Plan: DC PT at this time due to continuous c/o fatigue and SOB . Patient will benefit from further PT after completing cardiac evaluation via cardiologist   Goals:   Active         Long Term Goals         Patient will be able to walk on level surface with no AD independently x 250 feet.  (Progressing)         Start:  05/09/25    Expected End:  07/04/25              Patient will demonstrate 5/5 RLE hip ABD (Progressing)         Start:  05/09/25    Expected End:  07/04/25              Patient will be able to perform tandem stance  x 10 sec (Progressing)         Start:  05/09/25    Expected End:  07/04/25               Resolved         Short Term Goals         Patient will be independent with HEP (Met)         Start:  05/09/25    Expected End:  05/16/25    Resolved:  06/06/25

## 2025-07-24 DIAGNOSIS — R13.10 DYSPHAGIA, UNSPECIFIED TYPE: ICD-10-CM

## 2025-07-24 DIAGNOSIS — R53.1 RIGHT SIDED WEAKNESS: Primary | ICD-10-CM

## 2025-07-24 DIAGNOSIS — I69.30 PERSONAL HISTORY OF STROKE WITH CURRENT RESIDUAL EFFECTS: Primary | ICD-10-CM

## 2025-07-24 DIAGNOSIS — R47.1 DYSARTHROSIS: ICD-10-CM

## 2025-07-24 DIAGNOSIS — R53.1 RIGHT SIDED WEAKNESS: ICD-10-CM

## 2025-07-24 DIAGNOSIS — I69.30 PERSONAL HISTORY OF STROKE WITH RESIDUAL EFFECTS: Primary | ICD-10-CM

## 2025-07-29 ENCOUNTER — CLINICAL SUPPORT (OUTPATIENT)
Dept: REHABILITATION | Facility: HOSPITAL | Age: 65
End: 2025-07-29
Payer: MEDICARE

## 2025-07-29 DIAGNOSIS — Z86.73 HISTORY OF CVA (CEREBROVASCULAR ACCIDENT): Primary | ICD-10-CM

## 2025-07-29 DIAGNOSIS — R53.1 RIGHT SIDED WEAKNESS: Primary | ICD-10-CM

## 2025-07-29 DIAGNOSIS — R53.1 RIGHT SIDED WEAKNESS: ICD-10-CM

## 2025-07-29 PROCEDURE — 97162 PT EVAL MOD COMPLEX 30 MIN: CPT

## 2025-07-29 PROCEDURE — 97165 OT EVAL LOW COMPLEX 30 MIN: CPT

## 2025-07-29 NOTE — LETTER
July 29, 2025  JENNIFER Espinal  [address] [address]      To whom it may concern,     The attached plan of care is being sent to you for review and reference.    You may indicate your approval by signing the document electronically, or by faxing/mailing a signed copy of the final page of this document back to the attention of Jimy Tapia, PT:         Plan of Care 7/29/25   Effective from: 7/29/2025  Effective to: 9/12/2025    Plan ID: [id number]            Participants as of Finalize on 7/29/2025    Name Type Comments Contact Info    JENNIFER Espinal Referring Provider  [phone]    Jimy Tapia PT Physical Therapist         Last Plan Note     Author: Jimy Tapia PT Status: Signed Last edited: 7/29/2025 10:15 AM         Outpatient Rehab    Physical Therapy Evaluation    Patient Name: Jono Keita  MRN: [id number]  YOB: 1960  Encounter Date: 7/29/2025    Therapy Diagnosis:   Encounter Diagnosis   Name Primary?    Right sided weakness Yes     Physician: Rico Braden DO    Physician Orders: Eval and Treat  Medical Diagnosis: Right sided weakness  Dysphagia, unspecified type  Dysarthrosis  Surgical Diagnosis: Not applicable for this Episode   Surgical Date: Not applicable for this Episode  Days Since Last Surgery: Not applicable for this Episode    Visit # / Visits Authorized:  1 / 1  Insurance Authorization Period: 7/24/2025 to 7/24/2026  Date of Evaluation: 7/29/2025  Plan of Care Certification: 7/29/2025 to 9/12/2025     Time In: 1025   Time Out: 1100  Total Time (in minutes): 35   Total Billable Time (in minutes): 35    Intake Outcome Measure for FOTO Survey    Therapist reviewed FOTO scores for Jono Keita on 7/29/2025.   FOTO report - see Media section or FOTO account episode details.     Intake Score (%): 57    Precautions:       Subjective   History of Present Illness  Jono is a 64 y.o. male who reports to  physical therapy with a chief concern of Difficulty with gait and R knee pain.     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            Diagnostic tests related to this condition: MRI studies and CT scan.          Dominant Hand: Right  History of Present Condition/Illness: Pt had a stroke years ago that affected R side, then also had one in April of 2025 that affected right side once more.  He was in acute hospital for a few weeks, then came for swingbed rehab from April to May, outpt therapy with physical therapy from May to end of June 2025.  Now, he is returning for additional rehab to improve overall use of R UE.  He also presents with LE weakness, balance issues, and speech vocal quality issues.          Activities of Daily Living  Social history was obtained from Patient.    General Prior Level of Function Comments: independent but with difficulty completing self care tasks  General Current Level of Function Comments: independent but with difficulty completing self care tasks  Patient Roles: Caregiver for pet  Patient Responsibilities: Community mobility, Driving, Financial management, Health management, Home management, Laundry, Meal prep, Personal ADL, Shopping, Yard work    Previously independent with activities of daily living? Yes     Currently independent with activities of daily living? Yes          Previously independent with instrumental activities of daily living? Yes     Currently independent with instrumental activities of daily living? Yes              Pain     Patient reports a current pain level of 3/10. Pain at best is reported as 3/10. Pain at worst is reported as 8/10.   Location: R knee  Clinical Progression (since onset): Stable  Pain Qualities: Aching, Sharp  Pain-Relieving Factors: Rest  Pain-Aggravating Factors: Walking, Kneeling, Bending, Squatting         Review of Systems  Patient reports: Dizziness, Lower Extremity Neurological Deficits, Cardiac History,  Diabetes, Balance Loss, and Weakness          Past Medical History/Physical Systems Review:   Jono Keita  has no past medical history on file.    Jono Keita  has a past surgical history that includes Back surgery and Knee surgery.    Jono has a current medication list which includes the following prescription(s): amlodipine, apixaban, atorvastatin, dapagliflozin propanediol, fluticasone propionate, gabapentin, and tamsulosin.    Review of patient's allergies indicates:  No Known Allergies     Objective            Hip Strength - Planes of Motion   Right Strength Right Pain Left Strength Left  Pain   Flexion (L2) 3+         Extension           ABduction 3-         ADduction           Internal Rotation           External Rotation               Knee Strength   Right Strength Right Pain Left Strength Left  Pain   Flexion (S2) 3+         Prone Flexion 3+         Extension (L3) 3+                       Timed Up & Go (TUG)  Time: 19 seconds     An older adult who takes >=12 seconds to complete the TUG is at risk for falling.       Sit to Stand Testing  The patient completed 5 sit to stand transfers in 27 sec.               Gait Analysis  Walking Speed: Decreased    Right Side Walking Observations  Increased: Stance Time  Decreased: Step Length and Arm Swing  Right Foot Contact Pattern: Forefoot    Left Side Walking Observations  Decreased: Step Length  Left Foot Contact Pattern: Flat foot         Treatment:       Time Entry(in minutes):  PT Evaluation (Moderate) Time Entry: 35    Assessment & Plan   Assessment  Jono presents with a condition of Moderate complexity.   Presentation of Symptoms: Stable       Functional Limitations: Ambulating on uneven surfaces, Decreased ambulation distance/endurance, Gait limitations, Getting off the floor, Increased risk of fall, Maintaining balance  Impairments: Abnormal gait, Abnormal muscle firing, Abnormal muscle tone, Impaired balance, Pain with functional activity, Weight-bearing  intolerance  Personal Factors Affecting Prognosis: Pain    Patient Goal for Therapy (PT): To walk with good balance and less pain.  Assessment Details: Patient has R LE residual weakness from CVA and R knee pain that limits functional mobility and gait. Patient will benefit from gait , balance strength and endurance training in order to improve balance and gait ability and speed.    Plan  From a physical therapy perspective, the patient would benefit from: Skilled Rehab Services    Planned therapy interventions include: Therapeutic exercise, Therapeutic activities, Neuromuscular re-education, and Manual therapy.    Planned modalities to include: Electrical stimulation - passive/unattended and Other (Comment). Dry needling      Visit Frequency: 2 times Per Week for 6 Weeks.       This plan was discussed with Patient.   Discussion participants: Agreed Upon Plan of Care  Plan details: PT 2 x per week for gait , balance, strength training and manual therapy, dry needling and modalities as indicated           The patient's spiritual, cultural, and educational needs were considered, and the patient is agreeable to the plan of care and goals.       Case conference performed with Jen Hoffman PTA regarding patient plan of care.     Goals:   Active       Long Term Goals       Patient will perform TUG in under 10 seconds       Start:  07/29/25    Expected End:  09/12/25            Patient will have pain of 2/10 at worst at R knee       Start:  07/29/25    Expected End:  09/12/25            Patient will perform 5 x up and down in under 10 seconds       Start:  07/29/25    Expected End:  09/12/25               Short Term Goals       Patient will be independent with HEP       Start:  07/29/25    Expected End:  08/08/25                Jimy Tapia PT      Physician signature__________________________   Date___________________________     Clinical Information for Insurance Authorization     Dates of Services: 07/29/2025  to 09/12/2025  Discharge Plan: Patient will be discharged from skilled physical therapy treatment once all goals have been met, patient has plateaued, or physician/insurance requests discontinuation of care. Patient will be discharged with a home exercise program.   Type of therapy: Neuro Rehabilitative  ICD-10 Diagnosis Code(s): I69.351  Which side is symptomatic? Right  Surgical: No  Surgical procedure: N/A  Surgery date: N/A.  Presenting symptoms/diagnoses are unspecified in nature.  Presenting symptoms are non-radiating in nature.   The rehabilitation is not related to a diagnosis of cancer.  The rehabilitation is not related to a diagnosis of lymphedema.  Patient's clinical presentation is:  Moderate objective and functional deficits: consistent symptoms and/or symptoms that are intensified with activity with moderate loss of range of motion, strength, or ability to perform daily tasks  CPT Codes Requested:  53491 [therapeutic exercise], 61694 [neuromuscular re-education], 01706 [gait training], 94644 [manual therapy], 34680 [therapeutic activities], 35885 [ultrasound], 24726 [unattended electrical stimulation], and 57687 [needle insertion(s) without injection(s), 3 or more muscles]             Current Participants as of 7/29/2025    Name Type Comments Contact Info    JENNIFER Espinal Referring Provider  [phone]    Signature pending    Jimy Tapia PT Physical Therapist                  Sincerely,      Jimy Tapia PT  [facility]                                                            Dear Jimy Tapia, PT,    RE: Mr. Jono Keita, MRN: [id number]    I certify that I have reviewed the attached plan of care and agree to the details within.        ___________________________  ___________________________  Provider Printed Name   Provider Signed Name      ___________________________  Date and Time

## 2025-07-29 NOTE — Clinical Note
July 29, 2025  JENNIFER Espinal  [address] [address]      To whom it may concern,     The attached plan of care is being sent to you for review and reference.    You may indicate your approval by signing the document electronically, or by faxing/mailing a signed copy of the final page of this document back to the attention of Jimy Tapia, PT:         Plan of Care 7/29/25   Effective from: 7/29/2025  Effective to: 9/12/2025    Active  Plan ID: [id number]           Participants as of 7/29/2025    Name Type Comments Contact Info    JENNIFER Espinal Referring Provider  [phone]    Jimy Tapia, PT Physical Therapist        Last Plan Note     Author: Jimy Tapia, PT Status: Addendum Last edited: 7/29/2025 10:15 AM         Outpatient Rehab    Physical Therapy Evaluation    Patient Name: Jono Keita  MRN: [id number]  YOB: 1960  Encounter Date: 7/29/2025    Therapy Diagnosis:   Encounter Diagnosis   Name Primary?    Right sided weakness Yes     Physician: Ramos Christian,*    Physician Orders: Eval and Treat  Medical Diagnosis: Right sided weakness  Dysphagia, unspecified type  Dysarthrosis  Surgical Diagnosis: Not applicable for this Episode   Surgical Date: Not applicable for this Episode  Days Since Last Surgery: Not applicable for this Episode    Visit # / Visits Authorized:  1 / 1  Insurance Authorization Period: 7/24/2025 to 7/24/2026  Date of Evaluation: 7/29/2025  Plan of Care Certification: 7/29/2025 to 9/12/2025     Time In: 1025   Time Out: 1100  Total Time (in minutes): 35   Total Billable Time (in minutes): 35    Intake Outcome Measure for FOTO Survey    Therapist reviewed FOTO scores for Jono Keita on 7/29/2025.   FOTO report - see Media section or FOTO account episode details.     Intake Score (%): 57    Precautions:       Subjective   History of Present Illness  Jono is a 64 y.o. male who reports to  physical therapy with a chief concern of Difficulty with gait and R knee pain.     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            Diagnostic tests related to this condition: MRI studies and CT scan.          Dominant Hand: Right  History of Present Condition/Illness: Pt had a stroke years ago that affected R side, then also had one in April of 2025 that affected right side once more.  He was in acute hospital for a few weeks, then came for swingbed rehab from April to May, outpt therapy with physical therapy from May to end of June 2025.  Now, he is returning for additional rehab to improve overall use of R UE.  He also presents with LE weakness, balance issues, and speech vocal quality issues.          Activities of Daily Living  Social history was obtained from Patient.    General Prior Level of Function Comments: independent but with difficulty completing self care tasks  General Current Level of Function Comments: independent but with difficulty completing self care tasks  Patient Roles: Caregiver for pet  Patient Responsibilities: Community mobility, Driving, Financial management, Health management, Home management, Laundry, Meal prep, Personal ADL, Shopping, Yard work    Previously independent with activities of daily living? Yes     Currently independent with activities of daily living? Yes          Previously independent with instrumental activities of daily living? Yes     Currently independent with instrumental activities of daily living? Yes              Pain     Patient reports a current pain level of 3/10. Pain at best is reported as 3/10. Pain at worst is reported as 8/10.   Location: R knee  Clinical Progression (since onset): Stable  Pain Qualities: Aching, Sharp  Pain-Relieving Factors: Rest  Pain-Aggravating Factors: Walking, Kneeling, Bending, Squatting         Review of Systems  Patient reports: Dizziness, Lower Extremity Neurological Deficits, Cardiac History,  Diabetes, Balance Loss, and Weakness          Past Medical History/Physical Systems Review:   Jono Keita  has no past medical history on file.    Jono Keita  has a past surgical history that includes Back surgery and Knee surgery.    Jono has a current medication list which includes the following prescription(s): amlodipine, apixaban, atorvastatin, dapagliflozin propanediol, fluticasone propionate, gabapentin, and tamsulosin.    Review of patient's allergies indicates:  No Known Allergies     Objective            Hip Strength - Planes of Motion   Right Strength Right Pain Left Strength Left  Pain   Flexion (L2) 3+         Extension           ABduction 3-         ADduction           Internal Rotation           External Rotation               Knee Strength   Right Strength Right Pain Left Strength Left  Pain   Flexion (S2) 3+         Prone Flexion 3+         Extension (L3) 3+                       Timed Up & Go (TUG)  Time: 19 seconds     An older adult who takes >=12 seconds to complete the TUG is at risk for falling.       Sit to Stand Testing  The patient completed 5 sit to stand transfers in 27 sec.               Gait Analysis  Walking Speed: Decreased    Right Side Walking Observations  Increased: Stance Time  Decreased: Step Length and Arm Swing  Right Foot Contact Pattern: Forefoot    Left Side Walking Observations  Decreased: Step Length  Left Foot Contact Pattern: Flat foot         Treatment:       Time Entry(in minutes):  PT Evaluation (Moderate) Time Entry: 35    Assessment & Plan   Assessment  Jono presents with a condition of Moderate complexity.   Presentation of Symptoms: Stable       Functional Limitations: Ambulating on uneven surfaces, Decreased ambulation distance/endurance, Gait limitations, Getting off the floor, Increased risk of fall, Maintaining balance  Impairments: Abnormal gait, Abnormal muscle firing, Abnormal muscle tone, Impaired balance, Pain with functional activity, Weight-bearing  intolerance  Personal Factors Affecting Prognosis: Pain    Patient Goal for Therapy (PT): To walk with good balance and less pain.  Assessment Details: Patient has R LE residual weakness from CVA and R knee pain that limits functional mobility and gait. Patient will benefit from gait , balance strength and endurance training in order to improve balance and gait ability and speed.    Plan  From a physical therapy perspective, the patient would benefit from: Skilled Rehab Services    Planned therapy interventions include: Therapeutic exercise, Therapeutic activities, Neuromuscular re-education, and Manual therapy.    Planned modalities to include: Electrical stimulation - passive/unattended and Other (Comment). Dry needling      Visit Frequency: 2 times Per Week for 6 Weeks.       This plan was discussed with Patient.   Discussion participants: Agreed Upon Plan of Care  Plan details: PT 2 x per week for gait , balance, strength training and manual therapy, dry needling and modalities as indicated           The patient's spiritual, cultural, and educational needs were considered, and the patient is agreeable to the plan of care and goals.       Case conference performed with Jen Hoffman PTA regarding patient plan of care.     Goals:   Active       Long Term Goals       Patient will perform TUG in under 10 seconds       Start:  07/29/25    Expected End:  09/12/25            Patient will have pain of 2/10 at worst at R knee       Start:  07/29/25    Expected End:  09/12/25            Patient will perform 5 x up and down in under 10 seconds       Start:  07/29/25    Expected End:  09/12/25               Short Term Goals       Patient will be independent with HEP       Start:  07/29/25    Expected End:  08/08/25                Jimy Tapia PT      Physician signature__________________________   Date___________________________     Clinical Information for Insurance Authorization     Dates of Services: 07/29/2025  to 09/12/2025  Discharge Plan: Patient will be discharged from skilled physical therapy treatment once all goals have been met, patient has plateaued, or physician/insurance requests discontinuation of care. Patient will be discharged with a home exercise program.   Type of therapy: Neuro Rehabilitative  ICD-10 Diagnosis Code(s): I69.351  Which side is symptomatic? Right  Surgical: No  Surgical procedure: N/A  Surgery date: N/A.  Presenting symptoms/diagnoses are unspecified in nature.  Presenting symptoms are non-radiating in nature.   The rehabilitation is not related to a diagnosis of cancer.  The rehabilitation is not related to a diagnosis of lymphedema.  Patient's clinical presentation is:  Moderate objective and functional deficits: consistent symptoms and/or symptoms that are intensified with activity with moderate loss of range of motion, strength, or ability to perform daily tasks  CPT Codes Requested:  31811 [therapeutic exercise], 94612 [neuromuscular re-education], 12058 [gait training], 24392 [manual therapy], 85778 [therapeutic activities], 80359 [ultrasound], 51585 [unattended electrical stimulation], and 32228 [needle insertion(s) without injection(s), 3 or more muscles]                 Sincerely,      Jimy Tapia, PT  [facility]                                                            Dear Jimy Tapia, PT,    RE: Mr. Jono Keita, MRN: [id number]    I certify that I have reviewed the attached plan of care and agree to the details within.        ___________________________  ___________________________  Provider Printed Name   Provider Signed Name      ___________________________  Date and Time

## 2025-07-29 NOTE — Clinical Note
July 29, 2025  JENNIFER Espinal  [address] [address]      To whom it may concern,     The attached plan of care is being sent to you for review and reference.    You may indicate your approval by signing the document electronically, or by faxing/mailing a signed copy of the final page of this document back to the attention of Danna Reyes OT:         Plan of Care 7/29/25   Effective from: 7/29/2025  Effective to: 9/29/2025    Plan ID: [id number]            Participants as of Finalize on 7/29/2025    Name Type Comments Contact Info    JENNIFER Espinal Referring Provider  [phone]    Danna Reyes OT Occupational Therapist         Last Plan Note     Author: Danna Reyes OT Status: Addendum Last edited: 7/29/2025  9:30 AM         Outpatient Rehab    Occupational Therapy Evaluation    Patient Name: Jono Keita  MRN: [id number]  YOB: 1960  Encounter Date: 7/29/2025    Therapy Diagnosis:   Encounter Diagnoses   Name Primary?    History of CVA (cerebrovascular accident) Yes    Right sided weakness      Physician: Ramos Christian,*    Physician Orders: Eval and Treat  Medical Diagnosis: Personal history of stroke with current residual effects  Right sided weakness  Dysphagia, unspecified type  Surgical Diagnosis: Not applicable for this Episode   Surgical Date: Not applicable for this Episode  Days Since Last Surgery: Not applicable for this Episode    Visit # / Visits Authorized: 1 / 1  Insurance Authorization Period: 7/24/2025 to 7/24/2026  Date of Evaluation: 7/29/2025  Plan of Care Certification: 7/29/2025 to 9/29/2025     Time In: 1000   Time Out: 1023  Total Time (in minutes): 23   Total Billable Time (in minutes): 23    Intake Outcome Measure for FOTO Survey    Therapist reviewed FOTO scores for Jono Keita on 7/29/2025.   FOTO report - see Media section or FOTO account episode details.     Intake Score (%): 57    Precautions: none        Subjective   History of Present Illness  Jono is a 64 y.o. male who reports to occupational therapy with a chief concern of right arm weakness, gross and fine motor.     The patient reports a medical diagnosis of Cerbrovascular Accident.                      Dominant Hand: Right  History of Present Condition/Illness: Pt had a stroke years ago that affected R side, then also had one in April of 2025 that affected right side once more.  He was in acute hospital for a few weeks, then came for swingbed rehab from April to May, outpt therapy with physical therapy from May to end of June 2025.  Now, he is returning for additional rehab to improve overall use of R UE.  He also presents with LE weakness, balance issues, and speech vocal quality issues.  OT to address RUE impairments.    Activities of Daily Living  Social history was obtained from Patient, Family, and Spouse.    General Prior Level of Function Comments: independent but with difficulty completing self care tasks  General Current Level of Function Comments: independent but with difficulty completing self care tasks  Patient Roles: Caregiver for pet  Patient Responsibilities: Community mobility, Health management, Laundry, Personal ADL, Yard work, Driving, Home management    Previously independent with activities of daily living? Yes     Currently independent with activities of daily living? Yes          Previously independent with instrumental activities of daily living? Yes     Currently independent with instrumental activities of daily living? Yes              Pain     Patient reports a current pain level of 6/10. Pain at best is reported as 3/10. Pain at worst is reported as 6/10.   Clinical Progression (since onset): Stable  Pain Qualities: Needle-like, Aching  Pain-Relieving Factors: Rest  Pain-Aggravating Factors: Reaching, Movement         Living Arrangements  Living Situation  Housing: Home independently  Living Arrangements: Family  members  Support Systems: Friends/neighbors        Employment  Does the patient's condition impact their ability to work?: Yes  Employment Status: Not employed  disabled      Past Medical History/Physical Systems Review:   Jono Keita  has no past medical history on file.    Jono Keita  has a past surgical history that includes Back surgery and Knee surgery.    Jono has a current medication list which includes the following prescription(s): amlodipine, apixaban, atorvastatin, dapagliflozin propanediol, fluticasone propionate, gabapentin, and tamsulosin.    Review of patient's allergies indicates:  No Known Allergies     Objective   Posture  Patient presents with a Forward head position.     Shoulders are Rounded.             Shoulder Range of Motion  Right Shoulder   Active (deg) Passive (deg) Pain   Flexion 90       Extension 40       Scaption 90       ABduction 80       ADduction         Horizontal ABduction         Horizontal ADduction         External Rotation (Shoulder ABducted 0 degrees)         External Rotation (Shoulder ABducted 45 degrees)         External Rotation (Shoulder ABducted 90 degrees) 75       Internal Rotation (Shoulder ABducted 0 degrees)         Internal Rotation (Shoulder ABducted 45 degrees) 20       Internal Rotation (Shoulder ABducted 90 degrees)                   unremarkable              Shoulder Strength - Planes of Motion   Right Strength Right Pain Left Strength Left  Pain   Flexion 3+   4+     Extension 3+   4+     ABduction 3+   4+     ADduction 3+   4+     Horizontal ABduction 3+   4+     Horizontal ADduction 3+   4+     Internal Rotation 0° 3+   4+     Internal Rotation 90° 3+   4+     External Rotation 0° 3+   4+     External Rotation 90° 3+   4+           Right  Strength  Right Hand Dynamometer Position: 2  Elbow Position Forearm Position Trial 1 (lbs) Trial 2  (lbs) Trial 3  (lbs) Average  (lbs) Pain   Flexed Neutral 28 27 36 30.33         Left  Strength  Left Hand  Dynamometer Position: 2  Elbow Position Forearm Position Trial 1 (lbs) Trial 2 (lbs) Trial 3 (lbs) Average (lbs) Pain   Flexed Neutral 90 90 96 92                   Treatment: no tx, submitting to [employer] for approval of future visits       Time Entry(in minutes):  OT Evaluation (Low) Time Entry: 23    Assessment & Plan   Assessment  Jono    Presentation of Symptoms: Stable                         Occupational profile: Pt can complete activities independently but with extra time and unable to control FM and grasp effectively to feel confident in completing tasks effectively, not thoroughly.      Patient Goal for Therapy (OT): want to use my right arm better with less pain  Prognosis: Excellent  Prognosis Details: Pt has some upper back and scap weakness, contributing to decreased ability to complete full AROM of RUE with good control and accuracy, causing pain with AROM above 90 degrees of shoulder flexion, creating problems and difficulty with all UB ADL and IADL tasks requiring reach, carry, and lift tasks.    Plan  From an occupational therapy perspective, the patient would benefit from: Skilled Rehab Services    Planned therapy interventions include: Therapeutic exercise, Therapeutic activities, Neuromuscular re-education, and Manual therapy.    Planned modalities to include: Cryotherapy (cold pack), Thermotherapy (hot pack), and Electrical stimulation - passive/unattended.        Visit Frequency: 2 times Per Week for 6 Weeks.       This plan was discussed with Patient.   Discussion participants: Agreed Upon Plan of Care  Plan details: Pt will report to OT sessions for 2x weekly for ROM, FM and GM strength, pain reduction management of RUE to improve comfort and completion of all ADL and IADL tasks he needs to complete daily.          The patient's spiritual, cultural, and educational needs were considered, and the patient is agreeable to the plan of care and goals.           Goals:   Active       Dressing        Patient will zip pants with reports of no difficulty       Start:  07/29/25    Expected End:  08/22/25            Patient will button pants with reports of no difficulty by pt        Start:  07/29/25    Expected End:  08/22/25               Eating       Patient will use normal utensils independently and with no difficulty to consume daily meals        Start:  07/29/25    Expected End:  08/15/25            Patient is able to open items independently to access meal items no extra time needed and with no difficulty       Start:  07/29/25    Expected End:  08/22/25               Fine hand motor use       Patient will grasp wash cloth effectively and with reports of no diffulty per pt report       Start:  07/29/25    Expected End:  08/15/25               Functional outcome       Patient will show a significant change in FOTO patient-reported outcome tool to demonstrate subjective improvement       Start:  07/29/25    Expected End:  08/22/25            Patient will demonstrate independence in home program for support of progression       Start:  07/29/25    Expected End:  08/15/25               Home activities       Patient will perform household outdoor maintenance tasks with no difficulty per pt choice of task       Start:  07/29/25    Expected End:  08/22/25               Hygiene and grooming       Patient will wash face with reports of no difficulty       Start:  07/29/25    Expected End:  08/15/25            Patient will shave face with reports of no difficulty per pt report       Start:  07/29/25    Expected End:  08/15/25               Leisure activities       Patient will participate in fishing activity with reports of minimal difficulty       Start:  07/29/25    Expected End:  08/15/25               Lifting & carrying objects       Patient will lift water glass for drinking with no difficulty per his report       Start:  07/29/25    Expected End:  08/15/25            Patient will carry his dinner plate from  stovetop to kitchen table with reports of no difficulty per pt       Start:  07/29/25    Expected End:  08/15/25               Pain       Patient will report pain of 3/10 demonstrating a reduction of overall pain with movement and reaching       Start:  07/29/25    Expected End:  08/15/25               Range of Motion       Patient will achieve right shoulder flexion of 115 degrees       Start:  07/29/25    Expected End:  08/15/25            Patient will achieve right shoulder internal rotation of 45 degrees in 45 degrees abd       Start:  07/29/25    Expected End:  08/15/25               Strength       Patient will achieve right shoulder flexion strength of 4/5       Start:  07/29/25    Expected End:  08/15/25            Patient will achieve right shoulder external rotation strength of 4/5 in 90 degrees abd       Start:  07/29/25    Expected End:  08/22/25            Patient will achieve right shoulder internal rotation strength of 4/5 in 45 degrees abd       Start:  07/29/25    Expected End:  08/22/25               Toileting       Patient will perform toileting hygiene for bowel movement with no difficulty per pt report       Start:  07/29/25    Expected End:  08/22/25              Clinical Information for Insurance Authorization     Dates of Services: 07/29/20258 to 09/29/2025  Discharge Plan: Patient will be discharged from skilled occupational therapy treatment once all goals have been met, patient has plateaued, or physician/insurance requests discontinuation of care. Patient will be discharged with a home exercise program.   Type of therapy: Neuro Rehabilitative  ICD-10 Diagnosis Code(s): R53.1  Which side is symptomatic? Right  Surgical: No  Surgical procedure: N/A  Surgery date: N/A.  Presenting symptoms/diagnoses are unspecified in nature.  Presenting symptoms are radiating in nature.   The rehabilitation is not related to a diagnosis of cancer.  The rehabilitation is not related to a diagnosis of  lymphedema.  Patient's clinical presentation is:  Mild objective and functional deficits: sporadic symptoms with minimal loss of range of motion, strength, or ability to perform daily tasks  CPT Codes Requested:  76322 [therapeutic exercise], 65069 [neuromuscular re-education], 14337 [manual therapy], 66857 [therapeutic activities], 47802 [self care/home management training], and 41230 [unattended electrical stimulation]        Danna Reyes OT           Current Participants as of 7/29/2025    Name Type Comments Contact Info    JENNIFER Espinal Referring Provider  [phone]    Signature pending    Danna Reyes OT Occupational Therapist      Signature pending            Sincerely,      Danna Reyes OT  [facility]                                                            Dear Danna Reyes OT,    RE: Mr. Jono Keita, MRN: [id number]    I certify that I have reviewed the attached plan of care and agree to the details within.        ___________________________  ___________________________  Provider Printed Name   Provider Signed Name      ___________________________  Date and Time

## 2025-07-29 NOTE — PROGRESS NOTES
Outpatient Rehab    Physical Therapy Evaluation    Patient Name: Jono Keita  MRN: [id number]  YOB: 1960  Encounter Date: 7/29/2025    Therapy Diagnosis:   Encounter Diagnosis   Name Primary?    Right sided weakness Yes     Physician: Ramos Christian,*    Physician Orders: Eval and Treat  Medical Diagnosis: Right sided weakness  Dysphagia, unspecified type  Dysarthrosis  Surgical Diagnosis: Not applicable for this Episode   Surgical Date: Not applicable for this Episode  Days Since Last Surgery: Not applicable for this Episode    Visit # / Visits Authorized:  1 / 1  Insurance Authorization Period: 7/24/2025 to 7/24/2026  Date of Evaluation: 7/29/2025  Plan of Care Certification: 7/29/2025 to 9/12/2025     Time In: 1025   Time Out: 1100  Total Time (in minutes): 35   Total Billable Time (in minutes): 35    Intake Outcome Measure for FOTO Survey    Therapist reviewed FOTO scores for Jono Keita on 7/29/2025.   FOTO report - see Media section or FOTO account episode details.     Intake Score (%): 57    Precautions:       Subjective   History of Present Illness  Jono is a 64 y.o. male who reports to physical therapy with a chief concern of Difficulty with gait and R knee pain.     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            Diagnostic tests related to this condition: MRI studies and CT scan.          Dominant Hand: Right  History of Present Condition/Illness: Pt had a stroke years ago that affected R side, then also had one in April of 2025 that affected right side once more.  He was in acute hospital for a few weeks, then came for swingbed rehab from April to May, outpt therapy with physical therapy from May to end of June 2025.  Now, he is returning for additional rehab to improve overall use of R UE.  He also presents with LE weakness, balance issues, and speech vocal quality issues.          Activities of Daily Living  Social history was obtained from Patient.     General Prior Level of Function Comments: independent but with difficulty completing self care tasks  General Current Level of Function Comments: independent but with difficulty completing self care tasks  Patient Roles: Caregiver for pet  Patient Responsibilities: Community mobility, Driving, Financial management, Health management, Home management, Laundry, Meal prep, Personal ADL, Shopping, Yard work    Previously independent with activities of daily living? Yes     Currently independent with activities of daily living? Yes          Previously independent with instrumental activities of daily living? Yes     Currently independent with instrumental activities of daily living? Yes              Pain     Patient reports a current pain level of 3/10. Pain at best is reported as 3/10. Pain at worst is reported as 8/10.   Location: R knee  Clinical Progression (since onset): Stable  Pain Qualities: Aching, Sharp  Pain-Relieving Factors: Rest  Pain-Aggravating Factors: Walking, Kneeling, Bending, Squatting         Review of Systems  Patient reports: Dizziness, Lower Extremity Neurological Deficits, Cardiac History, Diabetes, Balance Loss, and Weakness          Past Medical History/Physical Systems Review:   Jono Keita  has no past medical history on file.    Jono Keita  has a past surgical history that includes Back surgery and Knee surgery.    Jono has a current medication list which includes the following prescription(s): amlodipine, apixaban, atorvastatin, dapagliflozin propanediol, fluticasone propionate, gabapentin, and tamsulosin.    Review of patient's allergies indicates:  No Known Allergies     Objective            Hip Strength - Planes of Motion   Right Strength Right Pain Left Strength Left  Pain   Flexion (L2) 3+         Extension           ABduction 3-         ADduction           Internal Rotation           External Rotation               Knee Strength   Right Strength Right Pain Left Strength  Left  Pain   Flexion (S2) 3+         Prone Flexion 3+         Extension (L3) 3+                       Timed Up & Go (TUG)  Time: 19 seconds     An older adult who takes >=12 seconds to complete the TUG is at risk for falling.       Sit to Stand Testing  The patient completed 5 sit to stand transfers in 27 sec.               Gait Analysis  Walking Speed: Decreased    Right Side Walking Observations  Increased: Stance Time  Decreased: Step Length and Arm Swing  Right Foot Contact Pattern: Forefoot    Left Side Walking Observations  Decreased: Step Length  Left Foot Contact Pattern: Flat foot         Treatment:       Time Entry(in minutes):  PT Evaluation (Moderate) Time Entry: 35    Assessment & Plan   Assessment  Jono presents with a condition of Moderate complexity.   Presentation of Symptoms: Stable       Functional Limitations: Ambulating on uneven surfaces, Decreased ambulation distance/endurance, Gait limitations, Getting off the floor, Increased risk of fall, Maintaining balance  Impairments: Abnormal gait, Abnormal muscle firing, Abnormal muscle tone, Impaired balance, Pain with functional activity, Weight-bearing intolerance  Personal Factors Affecting Prognosis: Pain    Patient Goal for Therapy (PT): To walk with good balance and less pain.  Assessment Details: Patient has R LE residual weakness from CVA and R knee pain that limits functional mobility and gait. Patient will benefit from gait , balance strength and endurance training in order to improve balance and gait ability and speed.    Plan  From a physical therapy perspective, the patient would benefit from: Skilled Rehab Services    Planned therapy interventions include: Therapeutic exercise, Therapeutic activities, Neuromuscular re-education, and Manual therapy.    Planned modalities to include: Electrical stimulation - passive/unattended and Other (Comment). Dry needling      Visit Frequency: 2 times Per Week for 6 Weeks.       This plan was  discussed with Patient.   Discussion participants: Agreed Upon Plan of Care  Plan details: PT 2 x per week for gait , balance, strength training and manual therapy, dry needling and modalities as indicated           The patient's spiritual, cultural, and educational needs were considered, and the patient is agreeable to the plan of care and goals.       Case conference performed with Jen Hoffman PTA regarding patient plan of care.     Goals:   Active       Long Term Goals       Patient will perform TUG in under 10 seconds       Start:  07/29/25    Expected End:  09/12/25            Patient will have pain of 2/10 at worst at R knee       Start:  07/29/25    Expected End:  09/12/25            Patient will perform 5 x up and down in under 10 seconds       Start:  07/29/25    Expected End:  09/12/25               Short Term Goals       Patient will be independent with HEP       Start:  07/29/25    Expected End:  08/08/25                Jimy Tapia PT      Physician signature__________________________   Date___________________________     Clinical Information for Insurance Authorization     Dates of Services: 07/29/2025 to 09/12/2025  Discharge Plan: Patient will be discharged from skilled physical therapy treatment once all goals have been met, patient has plateaued, or physician/insurance requests discontinuation of care. Patient will be discharged with a home exercise program.   Type of therapy: Neuro Rehabilitative  ICD-10 Diagnosis Code(s): I69.351  Which side is symptomatic? Right  Surgical: No  Surgical procedure: N/A  Surgery date: N/A.  Presenting symptoms/diagnoses are unspecified in nature.  Presenting symptoms are non-radiating in nature.   The rehabilitation is not related to a diagnosis of cancer.  The rehabilitation is not related to a diagnosis of lymphedema.  Patient's clinical presentation is:  Moderate objective and functional deficits: consistent symptoms and/or symptoms that are  intensified with activity with moderate loss of range of motion, strength, or ability to perform daily tasks  CPT Codes Requested:  04308 [therapeutic exercise], 46272 [neuromuscular re-education], 57600 [gait training], 85922 [manual therapy], 24322 [therapeutic activities], 56506 [ultrasound], 70566 [unattended electrical stimulation], and 06081 [needle insertion(s) without injection(s), 3 or more muscles]

## 2025-07-29 NOTE — PROGRESS NOTES
Outpatient Rehab    Occupational Therapy Evaluation    Patient Name: Jono Keita  MRN: [id number]  YOB: 1960  Encounter Date: 7/29/2025    Therapy Diagnosis:   Encounter Diagnoses   Name Primary?    History of CVA (cerebrovascular accident) Yes    Right sided weakness      Physician: Ramos Christian,*    Physician Orders: Eval and Treat  Medical Diagnosis: Personal history of stroke with current residual effects  Right sided weakness  Dysphagia, unspecified type  Surgical Diagnosis: Not applicable for this Episode   Surgical Date: Not applicable for this Episode  Days Since Last Surgery: Not applicable for this Episode    Visit # / Visits Authorized: 1 / 1  Insurance Authorization Period: 7/24/2025 to 7/24/2026  Date of Evaluation: 7/29/2025  Plan of Care Certification: 7/29/2025 to 9/29/2025     Time In: 1000   Time Out: 1023  Total Time (in minutes): 23   Total Billable Time (in minutes): 23    Intake Outcome Measure for FOTO Survey    Therapist reviewed FOTO scores for Jono Keita on 7/29/2025.   FOTO report - see Media section or FOTO account episode details.     Intake Score (%): 57    Precautions: none       Subjective   History of Present Illness  Jono is a 64 y.o. male who reports to occupational therapy with a chief concern of right arm weakness, gross and fine motor.     The patient reports a medical diagnosis of Cerbrovascular Accident.                      Dominant Hand: Right  History of Present Condition/Illness: Pt had a stroke years ago that affected R side, then also had one in April of 2025 that affected right side once more.  He was in acute hospital for a few weeks, then came for swingbed rehab from April to May, outpt therapy with physical therapy from May to end of June 2025.  Now, he is returning for additional rehab to improve overall use of R UE.  He also presents with LE weakness, balance issues, and speech vocal quality issues.  OT to address RUE  impairments.    Activities of Daily Living  Social history was obtained from Patient, Family, and Spouse.    General Prior Level of Function Comments: independent but with difficulty completing self care tasks  General Current Level of Function Comments: independent but with difficulty completing self care tasks  Patient Roles: Caregiver for pet  Patient Responsibilities: Community mobility, Health management, Laundry, Personal ADL, Yard work, Driving, Home management    Previously independent with activities of daily living? Yes     Currently independent with activities of daily living? Yes          Previously independent with instrumental activities of daily living? Yes     Currently independent with instrumental activities of daily living? Yes              Pain     Patient reports a current pain level of 6/10. Pain at best is reported as 3/10. Pain at worst is reported as 6/10.   Clinical Progression (since onset): Stable  Pain Qualities: Needle-like, Aching  Pain-Relieving Factors: Rest  Pain-Aggravating Factors: Reaching, Movement         Living Arrangements  Living Situation  Housing: Home independently  Living Arrangements: Family members  Support Systems: Friends/neighbors        Employment  Does the patient's condition impact their ability to work?: Yes  Employment Status: Not employed  disabled      Past Medical History/Physical Systems Review:   Jono Keita  has no past medical history on file.    Jono Keita  has a past surgical history that includes Back surgery and Knee surgery.    Jono has a current medication list which includes the following prescription(s): amlodipine, apixaban, atorvastatin, dapagliflozin propanediol, fluticasone propionate, gabapentin, and tamsulosin.    Review of patient's allergies indicates:  No Known Allergies     Objective   Posture  Patient presents with a Forward head position.     Shoulders are Rounded.             Shoulder Range of Motion  Right Shoulder   Active (deg)  Passive (deg) Pain   Flexion 90       Extension 40       Scaption 90       ABduction 80       ADduction         Horizontal ABduction         Horizontal ADduction         External Rotation (Shoulder ABducted 0 degrees)         External Rotation (Shoulder ABducted 45 degrees)         External Rotation (Shoulder ABducted 90 degrees) 75       Internal Rotation (Shoulder ABducted 0 degrees)         Internal Rotation (Shoulder ABducted 45 degrees) 20       Internal Rotation (Shoulder ABducted 90 degrees)                   unremarkable              Shoulder Strength - Planes of Motion   Right Strength Right Pain Left Strength Left  Pain   Flexion 3+   4+     Extension 3+   4+     ABduction 3+   4+     ADduction 3+   4+     Horizontal ABduction 3+   4+     Horizontal ADduction 3+   4+     Internal Rotation 0° 3+   4+     Internal Rotation 90° 3+   4+     External Rotation 0° 3+   4+     External Rotation 90° 3+   4+           Right  Strength  Right Hand Dynamometer Position: 2  Elbow Position Forearm Position Trial 1 (lbs) Trial 2  (lbs) Trial 3  (lbs) Average  (lbs) Pain   Flexed Neutral 28 27 36 30.33         Left  Strength  Left Hand Dynamometer Position: 2  Elbow Position Forearm Position Trial 1 (lbs) Trial 2 (lbs) Trial 3 (lbs) Average (lbs) Pain   Flexed Neutral 90 90 96 92                   Treatment: no tx, submitting to [facility] for approval of future visits       Time Entry(in minutes):  OT Evaluation (Low) Time Entry: 23    Assessment & Plan   Assessment  Jono    Presentation of Symptoms: Stable                         Occupational profile: Pt can complete activities independently but with extra time and unable to control FM and grasp effectively to feel confident in completing tasks effectively, not thoroughly.      Patient Goal for Therapy (OT): want to use my right arm better with less pain  Prognosis: Excellent  Prognosis Details: Pt has some upper back and scap weakness, contributing to decreased  ability to complete full AROM of RUE with good control and accuracy, causing pain with AROM above 90 degrees of shoulder flexion, creating problems and difficulty with all UB ADL and IADL tasks requiring reach, carry, and lift tasks.    Plan  From an occupational therapy perspective, the patient would benefit from: Skilled Rehab Services    Planned therapy interventions include: Therapeutic exercise, Therapeutic activities, Neuromuscular re-education, and Manual therapy.    Planned modalities to include: Cryotherapy (cold pack), Thermotherapy (hot pack), and Electrical stimulation - passive/unattended.        Visit Frequency: 2 times Per Week for 6 Weeks.       This plan was discussed with Patient.   Discussion participants: Agreed Upon Plan of Care  Plan details: Pt will report to OT sessions for 2x weekly for ROM, FM and GM strength, pain reduction management of RUE to improve comfort and completion of all ADL and IADL tasks he needs to complete daily.          The patient's spiritual, cultural, and educational needs were considered, and the patient is agreeable to the plan of care and goals.           Goals:   Active       Dressing       Patient will zip pants with reports of no difficulty       Start:  07/29/25    Expected End:  08/22/25            Patient will button pants with reports of no difficulty by pt        Start:  07/29/25    Expected End:  08/22/25               Eating       Patient will use normal utensils independently and with no difficulty to consume daily meals        Start:  07/29/25    Expected End:  08/15/25            Patient is able to open items independently to access meal items no extra time needed and with no difficulty       Start:  07/29/25    Expected End:  08/22/25               Fine hand motor use       Patient will grasp wash cloth effectively and with reports of no diffulty per pt report       Start:  07/29/25    Expected End:  08/15/25               Functional outcome        Patient will show a significant change in FOTO patient-reported outcome tool to demonstrate subjective improvement       Start:  07/29/25    Expected End:  08/22/25            Patient will demonstrate independence in home program for support of progression       Start:  07/29/25    Expected End:  08/15/25               Home activities       Patient will perform household outdoor maintenance tasks with no difficulty per pt choice of task       Start:  07/29/25    Expected End:  08/22/25               Hygiene and grooming       Patient will wash face with reports of no difficulty       Start:  07/29/25    Expected End:  08/15/25            Patient will shave face with reports of no difficulty per pt report       Start:  07/29/25    Expected End:  08/15/25               Leisure activities       Patient will participate in fishing activity with reports of minimal difficulty       Start:  07/29/25    Expected End:  08/15/25               Lifting & carrying objects       Patient will lift water glass for drinking with no difficulty per his report       Start:  07/29/25    Expected End:  08/15/25            Patient will carry his dinner plate from stovetop to kitchen table with reports of no difficulty per pt       Start:  07/29/25    Expected End:  08/15/25               Pain       Patient will report pain of 3/10 demonstrating a reduction of overall pain with movement and reaching       Start:  07/29/25    Expected End:  08/15/25               Range of Motion       Patient will achieve right shoulder flexion of 115 degrees       Start:  07/29/25    Expected End:  08/15/25            Patient will achieve right shoulder internal rotation of 45 degrees in 45 degrees abd       Start:  07/29/25    Expected End:  08/15/25               Strength       Patient will achieve right shoulder flexion strength of 4/5       Start:  07/29/25    Expected End:  08/15/25            Patient will achieve right shoulder external rotation  strength of 4/5 in 90 degrees abd       Start:  07/29/25    Expected End:  08/22/25            Patient will achieve right shoulder internal rotation strength of 4/5 in 45 degrees abd       Start:  07/29/25    Expected End:  08/22/25               Toileting       Patient will perform toileting hygiene for bowel movement with no difficulty per pt report       Start:  07/29/25    Expected End:  08/22/25              Clinical Information for Insurance Authorization     Dates of Services: 07/29/20258 to 09/29/2025  Discharge Plan: Patient will be discharged from skilled occupational therapy treatment once all goals have been met, patient has plateaued, or physician/insurance requests discontinuation of care. Patient will be discharged with a home exercise program.   Type of therapy: Neuro Rehabilitative  ICD-10 Diagnosis Code(s): R53.1  Which side is symptomatic? Right  Surgical: No  Surgical procedure: N/A  Surgery date: N/A.  Presenting symptoms/diagnoses are unspecified in nature.  Presenting symptoms are radiating in nature.   The rehabilitation is not related to a diagnosis of cancer.  The rehabilitation is not related to a diagnosis of lymphedema.  Patient's clinical presentation is:  Mild objective and functional deficits: sporadic symptoms with minimal loss of range of motion, strength, or ability to perform daily tasks  CPT Codes Requested:  99036 [therapeutic exercise], 38979 [neuromuscular re-education], 94643 [manual therapy], 82572 [therapeutic activities], 34778 [self care/home management training], and 80379 [unattended electrical stimulation]        Danna Reyes OT

## 2025-07-29 NOTE — LETTER
July 29, 2025  JENNIFER Espinal  [address] [address]      To whom it may concern,     The attached plan of care is being sent to you for review and reference.    You may indicate your approval by signing the document electronically, or by faxing/mailing a signed copy of the final page of this document back to the attention of Danna Reyes OT:         Plan of Care 7/29/25   Effective from: 7/29/2025  Effective to: 9/29/2025    Plan ID: [id number]            Participants as of Finalize on 7/29/2025    Name Type Comments Contact Info    JENNIFER Espinal Referring Provider please review, sign, and return by fax to [phone] to Maira Barbosa [phone]    Danna Reyes OT Occupational Therapist         Last Plan Note     Author: Danna Reyes OT Status: Signed Last edited: 7/29/2025  9:30 AM         Outpatient Rehab    Occupational Therapy Evaluation    Patient Name: Jono Keita  MRN: [id number]  YOB: 1960  Encounter Date: 7/29/2025    Therapy Diagnosis:   Encounter Diagnoses   Name Primary?    History of CVA (cerebrovascular accident) Yes    Right sided weakness      Physician: Rico Braden DO    Physician Orders: Eval and Treat  Medical Diagnosis: Personal history of stroke with current residual effects  Right sided weakness  Dysphagia, unspecified type  Surgical Diagnosis: Not applicable for this Episode   Surgical Date: Not applicable for this Episode  Days Since Last Surgery: Not applicable for this Episode    Visit # / Visits Authorized: 1 / 1  Insurance Authorization Period: 7/24/2025 to 7/24/2026  Date of Evaluation: 7/29/2025  Plan of Care Certification: 7/29/2025 to 9/29/2025     Time In: 1000   Time Out: 1023  Total Time (in minutes): 23   Total Billable Time (in minutes): 23    Intake Outcome Measure for FOTO Survey    Therapist reviewed FOTO scores for Jono Keita on 7/29/2025.   FOTO report - see Media section or FOTO account  episode details.     Intake Score (%): 57    Precautions: none       Subjective   History of Present Illness  Jono is a 64 y.o. male who reports to occupational therapy with a chief concern of right arm weakness, gross and fine motor.     The patient reports a medical diagnosis of Cerbrovascular Accident.                      Dominant Hand: Right  History of Present Condition/Illness: Pt had a stroke years ago that affected R side, then also had one in April of 2025 that affected right side once more.  He was in acute hospital for a few weeks, then came for swingbed rehab from April to May, outpt therapy with physical therapy from May to end of June 2025.  Now, he is returning for additional rehab to improve overall use of R UE.  He also presents with LE weakness, balance issues, and speech vocal quality issues.  OT to address RUE impairments.    Activities of Daily Living  Social history was obtained from Patient, Family, and Spouse.    General Prior Level of Function Comments: independent but with difficulty completing self care tasks  General Current Level of Function Comments: independent but with difficulty completing self care tasks  Patient Roles: Caregiver for pet  Patient Responsibilities: Community mobility, Health management, Laundry, Personal ADL, Yard work, Driving, Home management    Previously independent with activities of daily living? Yes     Currently independent with activities of daily living? Yes          Previously independent with instrumental activities of daily living? Yes     Currently independent with instrumental activities of daily living? Yes              Pain     Patient reports a current pain level of 6/10. Pain at best is reported as 3/10. Pain at worst is reported as 6/10.   Clinical Progression (since onset): Stable  Pain Qualities: Needle-like, Aching  Pain-Relieving Factors: Rest  Pain-Aggravating Factors: Reaching, Movement         Living Arrangements  Living  Situation  Housing: Home independently  Living Arrangements: Family members  Support Systems: Friends/neighbors        Employment  Does the patient's condition impact their ability to work?: Yes  Employment Status: Not employed  disabled      Past Medical History/Physical Systems Review:   Jono Keita  has no past medical history on file.    Jono Keita  has a past surgical history that includes Back surgery and Knee surgery.    Jono has a current medication list which includes the following prescription(s): amlodipine, apixaban, atorvastatin, dapagliflozin propanediol, fluticasone propionate, gabapentin, and tamsulosin.    Review of patient's allergies indicates:  No Known Allergies     Objective   Posture  Patient presents with a Forward head position.     Shoulders are Rounded.             Shoulder Range of Motion  Right Shoulder   Active (deg) Passive (deg) Pain   Flexion 90       Extension 40       Scaption 90       ABduction 80       ADduction         Horizontal ABduction         Horizontal ADduction         External Rotation (Shoulder ABducted 0 degrees)         External Rotation (Shoulder ABducted 45 degrees)         External Rotation (Shoulder ABducted 90 degrees) 75       Internal Rotation (Shoulder ABducted 0 degrees)         Internal Rotation (Shoulder ABducted 45 degrees) 20       Internal Rotation (Shoulder ABducted 90 degrees)                   unremarkable              Shoulder Strength - Planes of Motion   Right Strength Right Pain Left Strength Left  Pain   Flexion 3+   4+     Extension 3+   4+     ABduction 3+   4+     ADduction 3+   4+     Horizontal ABduction 3+   4+     Horizontal ADduction 3+   4+     Internal Rotation 0° 3+   4+     Internal Rotation 90° 3+   4+     External Rotation 0° 3+   4+     External Rotation 90° 3+   4+           Right  Strength  Right Hand Dynamometer Position: 2  Elbow Position Forearm Position Trial 1 (lbs) Trial 2  (lbs) Trial 3  (lbs) Average  (lbs) Pain    Flexed Neutral 28 27 36 30.33         Left  Strength  Left Hand Dynamometer Position: 2  Elbow Position Forearm Position Trial 1 (lbs) Trial 2 (lbs) Trial 3 (lbs) Average (lbs) Pain   Flexed Neutral 90 90 96 92                   Treatment: no tx, [facility]       Time Entry(in minutes):  OT Evaluation (Low) Time Entry: 23    Assessment & Plan   Assessment  Jono    Presentation of Symptoms: Stable                         Occupational profile: Pt can complete activities independently but with extra time and unable to control FM and grasp effectively to feel confident in completing tasks effectively, not thoroughly.      Patient Goal for Therapy (OT): want to use my right arm better with less pain  Prognosis: Excellent  Prognosis Details: Pt has some upper back and scap weakness, contributing to decreased ability to complete full AROM of RUE with good control and accuracy, causing pain with AROM above 90 degrees of shoulder flexion, creating problems and difficulty with all UB ADL and IADL tasks requiring reach, carry, and lift tasks.    Plan  From an occupational therapy perspective, the patient would benefit from: Skilled Rehab Services    Planned therapy interventions include: Therapeutic exercise, Therapeutic activities, Neuromuscular re-education, and Manual therapy.    Planned modalities to include: Cryotherapy (cold pack), Thermotherapy (hot pack), and Electrical stimulation - passive/unattended.        Visit Frequency: 2 times Per Week for 6 Weeks.       This plan was discussed with Patient.   Discussion participants: Agreed Upon Plan of Care  Plan details: Pt will report to OT sessions for 2x weekly for ROM, FM and GM strength, pain reduction management of RUE to improve comfort and completion of all ADL and IADL tasks he needs to complete daily.          The patient's spiritual, cultural, and educational needs were considered, and the patient is agreeable to the  plan of care and goals.           Goals:   Active       Dressing       Patient will zip pants with reports of no difficulty       Start:  07/29/25    Expected End:  08/22/25            Patient will button pants with reports of no difficulty by pt        Start:  07/29/25    Expected End:  08/22/25               Eating       Patient will use normal utensils independently and with no difficulty to consume daily meals        Start:  07/29/25    Expected End:  08/15/25            Patient is able to open items independently to access meal items no extra time needed and with no difficulty       Start:  07/29/25    Expected End:  08/22/25               Fine hand motor use       Patient will grasp wash cloth effectively and with reports of no diffulty per pt report       Start:  07/29/25    Expected End:  08/15/25               Functional outcome       Patient will show a significant change in FOTO patient-reported outcome tool to demonstrate subjective improvement       Start:  07/29/25    Expected End:  08/22/25            Patient will demonstrate independence in home program for support of progression       Start:  07/29/25    Expected End:  08/15/25               Home activities       Patient will perform household outdoor maintenance tasks with no difficulty per pt choice of task       Start:  07/29/25    Expected End:  08/22/25               Hygiene and grooming       Patient will wash face with reports of no difficulty       Start:  07/29/25    Expected End:  08/15/25            Patient will shave face with reports of no difficulty per pt report       Start:  07/29/25    Expected End:  08/15/25               Leisure activities       Patient will participate in fishing activity with reports of minimal difficulty       Start:  07/29/25    Expected End:  08/15/25               Lifting & carrying objects       Patient will lift water glass for drinking with no difficulty per his report       Start:  07/29/25    Expected  End:  08/15/25            Patient will carry his dinner plate from stovetop to kitchen table with reports of no difficulty per pt       Start:  07/29/25    Expected End:  08/15/25               Pain       Patient will report pain of 3/10 demonstrating a reduction of overall pain with movement and reaching       Start:  07/29/25    Expected End:  08/15/25               Range of Motion       Patient will achieve right shoulder flexion of 115 degrees       Start:  07/29/25    Expected End:  08/15/25            Patient will achieve right shoulder internal rotation of 45 degrees in 45 degrees abd       Start:  07/29/25    Expected End:  08/15/25               Strength       Patient will achieve right shoulder flexion strength of 4/5       Start:  07/29/25    Expected End:  08/15/25            Patient will achieve right shoulder external rotation strength of 4/5 in 90 degrees abd       Start:  07/29/25    Expected End:  08/22/25            Patient will achieve right shoulder internal rotation strength of 4/5 in 45 degrees abd       Start:  07/29/25    Expected End:  08/22/25               Toileting       Patient will perform toileting hygiene for bowel movement with no difficulty per pt report       Start:  07/29/25    Expected End:  08/22/25              Clinical Information for Insurance Authorization     Dates of Services: 07/29/20258 to 09/29/2025  Discharge Plan: Patient will be discharged from skilled occupational therapy treatment once all goals have been met, patient has plateaued, or physician/insurance requests discontinuation of care. Patient will be discharged with a home exercise program.   Type of therapy: Neuro Rehabilitative  ICD-10 Diagnosis Code(s): R53.1  Which side is symptomatic? Right  Surgical: No  Surgical procedure: N/A  Surgery date: N/A.  Presenting symptoms/diagnoses are unspecified in nature.  Presenting symptoms are radiating in nature.   The rehabilitation is not related to a diagnosis of  cancer.  The rehabilitation is not related to a diagnosis of lymphedema.  Patient's clinical presentation is:  Mild objective and functional deficits: sporadic symptoms with minimal loss of range of motion, strength, or ability to perform daily tasks  CPT Codes Requested:  81248 [therapeutic exercise], 19424 [neuromuscular re-education], 69965 [manual therapy], 78184 [therapeutic activities], 28019 [self care/home management training], and 88204 [unattended electrical stimulation]        Danna Reyes OT           Current Participants as of 7/29/2025    Name Type Comments Contact Info    JENNIFER Espinal Referring Provider please review, sign, and return by fax to [phone] to Maira Barbosa [phone]    Signature pending    Danna Reyes OT Occupational Therapist                  Sincerely,      Danna Reyes OT  [facility]                                                            Dear Danna Reyes OT,    RE: Mr. Jono Keita, MRN: [id number]    I certify that I have reviewed the attached plan of care and agree to the details within.        ___________________________  ___________________________  Provider Printed Name   Provider Signed Name      ___________________________  Date and Time

## 2025-08-05 ENCOUNTER — CLINICAL SUPPORT (OUTPATIENT)
Dept: REHABILITATION | Facility: HOSPITAL | Age: 65
End: 2025-08-05
Payer: MEDICARE

## 2025-08-05 DIAGNOSIS — R13.10 DYSPHAGIA, UNSPECIFIED TYPE: ICD-10-CM

## 2025-08-05 DIAGNOSIS — Z86.73 HISTORY OF CVA (CEREBROVASCULAR ACCIDENT): ICD-10-CM

## 2025-08-05 DIAGNOSIS — Z86.73 HISTORY OF CVA (CEREBROVASCULAR ACCIDENT): Primary | ICD-10-CM

## 2025-08-05 DIAGNOSIS — I69.319 COGNITIVE DEFICIT AS LATE EFFECT OF CEREBROVASCULAR ACCIDENT (CVA): ICD-10-CM

## 2025-08-05 DIAGNOSIS — R53.1 RIGHT SIDED WEAKNESS: ICD-10-CM

## 2025-08-05 DIAGNOSIS — R53.1 RIGHT SIDED WEAKNESS: Primary | ICD-10-CM

## 2025-08-05 PROCEDURE — 97110 THERAPEUTIC EXERCISES: CPT | Mod: CQ

## 2025-08-05 PROCEDURE — 97110 THERAPEUTIC EXERCISES: CPT

## 2025-08-05 PROCEDURE — 97116 GAIT TRAINING THERAPY: CPT | Mod: CQ

## 2025-08-05 PROCEDURE — 97530 THERAPEUTIC ACTIVITIES: CPT

## 2025-08-05 PROCEDURE — 92523 SPEECH SOUND LANG COMPREHEN: CPT

## 2025-08-05 PROCEDURE — 97112 NEUROMUSCULAR REEDUCATION: CPT | Mod: CQ

## 2025-08-05 PROCEDURE — 92610 EVALUATE SWALLOWING FUNCTION: CPT

## 2025-08-05 NOTE — PROGRESS NOTES
Outpatient Rehab    Occupational Therapy Visit    Patient Name: Jono Keita  MRN: [id number]  YOB: 1960  Encounter Date: 8/5/2025    Therapy Diagnosis:   Encounter Diagnosis   Name Primary?    Right sided weakness Yes     Physician: Ramos Christian,*    Physician Orders: Eval and Treat  Medical Diagnosis: Personal history of stroke with current residual effects  Right sided weakness  Dysphagia, unspecified type  Surgical Diagnosis: Not applicable for this Episode   Surgical Date: Not applicable for this Episode  Days Since Last Surgery: Not applicable for this Episode    Visit # / Visits Authorized: 1 / 12  Insurance Authorization Period: 7/29/2025 to 9/29/2025  Date of Evaluation: 7/29/2025  Plan of Care Certification: 7/29/2025 to 9/29/2025      Time In: 1225   Time Out: 1255  Total Time (in minutes): 30   Total Billable Time (in minutes): 30    FOTO:  Intake Score (%): 57  Survey Score 2 (%): Not applicable for this Episode  Survey Score 3 (%): Not applicable for this Episode    Precautions:   None indicated    Subjective      Pain reported as 6/10. it's my neck that hurts more, and my knee    Objective            Treatment:  Therapeutic Exercise  TE 1: UBE x 6 min low level resist  TE 2: Lat pulls and tricep presses x 30 reps x 2 sets each, seated, requring instruction and correcting for proper technique; fatigued and required fair RB in between sets.  TE 3: Gripper level 3 resist x 10 reps x 3 sets each  Therapeutic Activity  TA 1: GREEN theraputty with small object and ball formation search x 10 min; pt needed instruction and cueing for proper technique and optimal benefit    Time Entry(in minutes):  Therapeutic Activity Time Entry: 10  Therapeutic Exercise Time Entry: 20    Assessment & Plan   Assessment: Physical therapy reported to OT that pt fatigued a good bit in their session earlier.  OT shortened the occupational therapy session per this information, since pt is driving himself to  appt today and will not need to be overly fatigued in his drive home.  Evaluation/Treatment Tolerance: Patient limited by fatigue    The patient will continue to benefit from skilled outpatient occupational therapy in order to address the deficits listed in the problem list on the initial evaluation, provide patient and family education, and maximize the patients level of independence in the home and community environments.     The patient's spiritual, cultural, and educational needs were considered, and the patient is agreeable to the plan of care and goals.           Plan: cont poc; pt should progress well with endurance and overall strength and working toward goals as he continues to come to therapy sessions.    Goals:   Active       Dressing       Patient will zip pants with reports of no difficulty (Progressing)       Start:  07/29/25    Expected End:  08/22/25            Patient will button pants with reports of no difficulty by pt  (Progressing)       Start:  07/29/25    Expected End:  08/22/25               Eating       Patient will use normal utensils independently and with no difficulty to consume daily meals  (Progressing)       Start:  07/29/25    Expected End:  08/15/25            Patient is able to open items independently to access meal items no extra time needed and with no difficulty (Progressing)       Start:  07/29/25    Expected End:  08/22/25               Fine hand motor use       Patient will grasp wash cloth effectively and with reports of no diffulty per pt report (Progressing)       Start:  07/29/25    Expected End:  08/15/25               Functional outcome       Patient will show a significant change in FOTO patient-reported outcome tool to demonstrate subjective improvement (Progressing)       Start:  07/29/25    Expected End:  08/22/25            Patient will demonstrate independence in home program for support of progression (Progressing)       Start:  07/29/25    Expected End:   08/15/25               Home activities       Patient will perform household outdoor maintenance tasks with no difficulty per pt choice of task (Progressing)       Start:  07/29/25    Expected End:  08/22/25               Hygiene and grooming       Patient will wash face with reports of no difficulty (Progressing)       Start:  07/29/25    Expected End:  08/15/25            Patient will shave face with reports of no difficulty per pt report (Progressing)       Start:  07/29/25    Expected End:  08/15/25               LTG       Pt will improve to WFL AROM of RUE with all ADL and IADL per FOTO assessment.       Start:  08/05/25    Expected End:  09/26/25               Leisure activities       Patient will participate in fishing activity with reports of minimal difficulty (Progressing)       Start:  07/29/25    Expected End:  08/15/25               Lifting & carrying objects       Patient will lift water glass for drinking with no difficulty per his report (Progressing)       Start:  07/29/25    Expected End:  08/15/25            Patient will carry his dinner plate from stovetop to kitchen table with reports of no difficulty per pt (Progressing)       Start:  07/29/25    Expected End:  08/15/25               Pain       Patient will report pain of 3/10 demonstrating a reduction of overall pain with movement and reaching (Progressing)       Start:  07/29/25    Expected End:  08/15/25               Range of Motion       Patient will achieve right shoulder flexion of 115 degrees (Progressing)       Start:  07/29/25    Expected End:  08/15/25            Patient will achieve right shoulder internal rotation of 45 degrees in 45 degrees abd (Progressing)       Start:  07/29/25    Expected End:  08/15/25               Strength       Patient will achieve right shoulder flexion strength of 4/5 (Progressing)       Start:  07/29/25    Expected End:  08/15/25            Patient will achieve right shoulder external rotation strength  of 4/5 in 90 degrees abd (Progressing)       Start:  07/29/25    Expected End:  08/22/25            Patient will achieve right shoulder internal rotation strength of 4/5 in 45 degrees abd (Progressing)       Start:  07/29/25    Expected End:  08/22/25               Toileting       Patient will perform toileting hygiene for bowel movement with no difficulty per pt report (Progressing)       Start:  07/29/25    Expected End:  08/22/25                Danna Reyes, OT

## 2025-08-05 NOTE — LETTER
August 5, 2025  JENNIFER Espinal  [address] [facility] [address] [place] [id number]      To whom it may concern,     The attached plan of care is being sent to you for review and reference.    You may indicate your approval by signing the document electronically, or by faxing/mailing a signed copy of the final page of this document back to the attention of MARITA FarnsworthSLP:         Plan of Care 8/5/25   Effective from: 8/5/2025  Effective to: 9/30/2025    Plan ID: [id number]            Participants as of Finalize on 8/5/2025    Name Type Comments Contact Info    JENNIFER Espinal Referring Provider  [phone]    Christine Suárez CCC-SLP Speech Language Pathologist Please sign and return to [facility]. Fax: [phone]        Last Plan Note     Author: Christine Suárez CCC-SLP Status: Signed Last edited: 8/5/2025 11:00 AM         Outpatient Rehab    Speech-Language Pathology Evaluation    Patient Name: Jono Keita  MRN: [id number]  YOB: 1960  Encounter Date: 8/5/2025    Therapy Diagnosis:   Encounter Diagnoses   Name Primary?    History of CVA (cerebrovascular accident) Yes    Right sided weakness     Dysphagia, unspecified type     Cognitive deficit as late effect of cerebrovascular accident (CVA)      Physician: Ramos Christian,*    Physician Orders: Eval and Treat  Medical Diagnosis: Personal history of stroke with residual effects  Right sided weakness  Dysphagia, unspecified type  Surgical Diagnosis: Not applicable for this Episode   Surgical Date: Not applicable for this Episode  Days Since Last Surgery: Not applicable for this Episode    Visit # / Visits Authorized: 1 / 1   Insurance Authorization Period: 7/24/2025 to 7/24/2026  Date of Evaluation: 8/5/2025  Plan of Care Certification: 8/5/2025 to 9/30/25     Time In: 1105   Time Out: 1140  Total Time (in minutes): 35   Total Billable Time (in minutes):   20 Speech Language Eval, 15  Swallow Dysfunction Eval    Precautions: Standard, Aspiration, Fall       Subjective   History of Present Illness  Jono is a 64 y.o. male who reports to Speech-Language Pathology with a chief concern of Difficulty swallowing, shortness of breath, word finding/memory.     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       Prior Level of Function: Independent with swallow, cognition prior to CVA  Current Level of Function: Word finding difficulty, shortness of breath with meals/coughing and throat clearing    History of Present Condition/Illness: Pt with onset of recent CVA in April 2025 affecting R side, acute hospital for rehabilitation then admission to [facility] during months of April and May. Pt returns to [facility] secondary to ongoing and worsening difficulty with swallow, recall, word finding, shortness of breath.     Treatment History  Discharged From Past 30 Days: Skilled nursing facility     Past Medical History/Physical Systems Review:   Jono Keita  has no past medical history on file.    Jono Keita  has a past surgical history that includes Back surgery and Knee surgery.    Jono has a current medication list which includes the following prescription(s): amlodipine, apixaban, atorvastatin, dapagliflozin propanediol, fluticasone propionate, gabapentin, and tamsulosin.    Review of patient's allergies indicates:  No Known Allergies     Objective   Oral Peripheral Exam  Dentition and Oral Hygiene  The patient's current dental condition: Missing upper dentition.   Oral hygiene is Good.  Buccal and Oral Mucosa Exam  Buccal sensation-CN V-is Normal. Buccal strength-CN VII-is Normal. Oral mucosa observations: Normal  Labial Exam     Range of motion-CN VII-is Normal.                Lingual Exam  Range of motion-CN XII-is Decreased retraction. Strength-CN XII-is Decreased right.               Jaw Exam  Range of motion-CN V-is Normal.               Bedside Evaluation  Thin liquids via  cup edge x2, via straw sip x3  Puree via spoon x4  MCS trial x2         Findings: Pt with noted throat clearing, wet vocal quality with liquid intake, mild throat clear after solids. Pt able to achieve clear vocal quality given cues via ST for throat clear and swallow /double swallow    Cognition  Observed memory impairments include Decreased short-term memory, Decreased long-term memory, and Decreased working memory.   Observed attention impairments include None/intact.   Processing speed is Delayed. mildly    The patient demonstrates the ability to follow Two-step commands. WFL         Simple problem solving is Intact.           Safety judgment is Intact.      Cognitive-Communication  Intact: Verbal Reasoning  Impaired: Convergent Reasoning and Divergent Reasoning      Motor Speech  Breath Support  Breath Support: Inadequate for speech  Breath Support Inadequate Due To: Shortness of breath  worsens with PO intake     Intelligibility and Articulation  Intact: Phoneme  Impaired: Word-Level, Sentence-Level, and Conversation-Level    Pt with dysarthria in general secondary to missing anterior upper dentition, however decreased lingual strength noted with dyscoordination since onset CVA 2025.    Articulation  Speech Articulation: Distortions, Imprecise            Time Entry(in minutes):  Speech Sound Prod Eval w/ Lang Comp and Exp Time Entry: 20  Clinical Swallow Eval Time Entry: 15    Assessment & Plan      Diagnosis and Impressions:     Bedside Evaluation: Pt with throat clearing, change in vocal quality noted with intake of liquids, purees, solids. Pt able to achieve clear vocal quality in all trials given ST strategies. ST services warranted for further pharyngeal swallow strengthening and objective swallow assessment. Pt high risk of aspiration, with ST phone to referring provider regarding need for objective swallow assessment and request of order for swallow evaluation via FEES. Provider to review chart and fax  order.     Cognitive Eval: Pt presents with mild to moderate word finding and recall difficulties warranting skilled ST services.     Personal Factors Affecting Prognosis: Cognitive impairment       Education  Education was done with Patient.           ST demonstration and written info given regarding pt specific safe swallow strategies to utilize at this time.   Swallowing Recommendations  Drink Consistency Recommendation: Thin (IDDSI 0) via Cup  Food Consistency Recommendation: Soft and bite-sized (IDDSI 6)    Effective Swallowing Strategies: Alternating liquids and solids, Double swallow, Throat clear/cough     Plan  From a speech language pathology perspective, the patient would benefit from: Skilled Rehab Services  Planned therapy interventions and modalities include: Swallowing/dysphagia therapy and Cognitive therapy.        Planned instrumental assessments to include: Flexible endoscopic evaluation of swallowing and Other (Comment). Following receiving of order via patient provider    Visit Frequency: 2 times Per Week for 8 Weeks.       This plan was discussed with Patient.   Discussion participants: Agreed Upon Plan of Care  Plan details: Skilled ST services 2x/week x8 weeks regarding dysphagia, cognitive impairment    The patient's spiritual, cultural, and educational needs were considered, and the patient is agreeable to the plan of care and goals.     Goals:   Active       SLP       SLP Goal       Start:  08/05/25       LTG:   Patient will tolerate least restrictive diet with no overt s/sx aspiration.  Patient will demonstrate word finding skills in basic to moderate conversational tasks with 90% accuracy.   STG:  Patient will complete objective swallow assessment.   Patient will complete lingual base retractions exercises with 90% accuracy.  Patient will complete hard glottal attacks and vocal fold adduction/airway protection exercises with  90% accuracy.  Patient will complete pharyngeal strengthening  "exercises with 90% accuracy.    Patient will answer "wh" questions with 80% accuracy Independently.   Patient will complete divergent naming tasks with 80% accuracy Independently.   Patient will complete convergent naming tasks with 80% accuracy Independently.   Patient will complete short term memory tasks with 80% accuracy Independently.               Christine Suárez CCC-SLP    Physician Signature: _________________________________________________           Current Participants as of 8/5/2025    Name Type Comments Contact Info    JENNIFER Espinal Referring Provider  [phone]    Signature pending    Christine Suárez CCC-SLP Speech Language Pathologist Please sign and return to [facility]. Fax: [phone]     Signature pending            Sincerely,      CHIDI Farnsworth  [facility]                                                            Dear Christine Suárez CCC-SUMANTH,    RE: Mr. De La Cruz Mohsenjuarez, MRN: [id number]    I certify that I have reviewed the attached plan of care and agree to the details within.        ___________________________  ___________________________  Provider Printed Name   Provider Signed Name      ___________________________  Date and Time"

## 2025-08-05 NOTE — PROGRESS NOTES
Outpatient Rehab    Physical Therapy Visit    Patient Name: Jono Keita  MRN: [id number]  YOB: 1960  Encounter Date: 8/5/2025    Therapy Diagnosis:   Encounter Diagnoses   Name Primary?    Right sided weakness Yes    History of CVA (cerebrovascular accident)      Physician: Ramos Christian,*    Physician Orders: Eval and Treat  Medical Diagnosis: Personal history of stroke with current residual effects  Right sided weakness  Dysphagia, unspecified type  Surgical Diagnosis: Not applicable for this Episode   Surgical Date: Not applicable for this Episode  Days Since Last Surgery: Not applicable for this Episode    Visit # / Visits Authorized:  1 / 12  Insurance Authorization Period: 7/29/2025 to 9/12/2025  Date of Evaluation: 7/29/2025  Plan of Care Certification: 7/29/2025 to 9/12/2025      PT/PTA: PTA   Number of PTA visits since last PT visit:1  Time In: 1142   Time Out: 1222  Total Time (in minutes): 40   Total Billable Time (in minutes): 40    FOTO:  Intake Score (%): 57  Survey Score 2 (%): Not applicable for this Episode  Survey Score 3 (%): Not applicable for this Episode    Precautions:         Subjective   patient states that his right knee is hurting him today.  Pain reported as 4/10.      Objective            Treatment:  Therapeutic Exercise  TE 1: seated LAQs 3 x 10 with 2 #  TE 2: seated marches 3 x 10 with 2 #  TE 3: seated hip abd 3 x 10 with red band  TE 4: seated hamstring curls with red band  Balance/Neuromuscular Re-Education  NMR 1: skc,piriformis 3 x 30 s/h  Therapeutic Activity  TA 1: nu step x 6 min for hip mobility and endurance  Gait Training  GT 1: paitent ambulated 200 ft with straight cane with 2 LOB    Time Entry(in minutes):  Gait Training Time Entry: 9  Neuromuscular Re-Education Time Entry: 10  Therapeutic Activity Time Entry: 6  Therapeutic Exercise Time Entry: 15    Assessment & Plan   Assessment: Patient became very winded and fatigued with exercises, patient  was provided frequent rest breaks. Patient had 2 LOB during ambulation       The patient will continue to benefit from skilled outpatient physical therapy in order to address the deficits listed in the problem list on the initial evaluation, provide patient and family education, and maximize the patients level of independence in the home and community environments.     The patient's spiritual, cultural, and educational needs were considered, and the patient is agreeable to the plan of care and goals.           Plan: continue PT as per POC    Goals:   Active       Long Term Goals       Patient will perform TUG in under 10 seconds       Start:  07/29/25    Expected End:  09/12/25            Patient will have pain of 2/10 at worst at R knee       Start:  07/29/25    Expected End:  09/12/25            Patient will perform 5 x up and down in under 10 seconds       Start:  07/29/25    Expected End:  09/12/25               Short Term Goals       Patient will be independent with HEP       Start:  07/29/25    Expected End:  08/08/25                Jen Hoffman PTA

## 2025-08-05 NOTE — PROGRESS NOTES
Outpatient Rehab    Speech-Language Pathology Evaluation    Patient Name: Jono Keita  MRN: [id number]  YOB: 1960  Encounter Date: 8/5/2025    Therapy Diagnosis:   Encounter Diagnoses   Name Primary?    History of CVA (cerebrovascular accident) Yes    Right sided weakness     Dysphagia, unspecified type     Cognitive deficit as late effect of cerebrovascular accident (CVA)      Physician: Ramos Christian,*    Physician Orders: Eval and Treat  Medical Diagnosis: Personal history of stroke with residual effects  Right sided weakness  Dysphagia, unspecified type  Surgical Diagnosis: Not applicable for this Episode   Surgical Date: Not applicable for this Episode  Days Since Last Surgery: Not applicable for this Episode    Visit # / Visits Authorized: 1 / 1   Insurance Authorization Period: 7/24/2025 to 7/24/2026  Date of Evaluation: 8/5/2025  Plan of Care Certification: 8/5/2025 to 9/30/25     Time In: 1105   Time Out: 1140  Total Time (in minutes): 35   Total Billable Time (in minutes):   20 Speech Language Eval, 15 Swallow Dysfunction Eval    Precautions: Standard, Aspiration, Fall       Subjective   History of Present Illness  Jono is a 64 y.o. male who reports to Speech-Language Pathology with a chief concern of Difficulty swallowing, shortness of breath, word finding/memory.     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       Prior Level of Function: Independent with swallow, cognition prior to CVA  Current Level of Function: Word finding difficulty, shortness of breath with meals/coughing and throat clearing    History of Present Condition/Illness: Pt with onset of recent CVA in April 2025 affecting R side, acute hospital for rehabilitation then admission to [facility] during months of April and May. Pt returns to [facility] secondary to ongoing and worsening difficulty with swallow, recall, word finding, shortness of breath.     Treatment History  Discharged From  Past 30 Days: Skilled nursing facility     Past Medical History/Physical Systems Review:   Jono Keita  has no past medical history on file.    Jono Keita  has a past surgical history that includes Back surgery and Knee surgery.    Jono has a current medication list which includes the following prescription(s): amlodipine, apixaban, atorvastatin, dapagliflozin propanediol, fluticasone propionate, gabapentin, and tamsulosin.    Review of patient's allergies indicates:  No Known Allergies     Objective   Oral Peripheral Exam  Dentition and Oral Hygiene  The patient's current dental condition: Missing upper dentition.   Oral hygiene is Good.  Buccal and Oral Mucosa Exam  Buccal sensation-CN V-is Normal. Buccal strength-CN VII-is Normal. Oral mucosa observations: Normal  Labial Exam     Range of motion-CN VII-is Normal.                Lingual Exam  Range of motion-CN XII-is Decreased retraction. Strength-CN XII-is Decreased right.               Jaw Exam  Range of motion-CN V-is Normal.               Bedside Evaluation  Thin liquids via cup edge x2, via straw sip x3  Puree via spoon x4  MCS trial x2         Findings: Pt with noted throat clearing, wet vocal quality with liquid intake, mild throat clear after solids. Pt able to achieve clear vocal quality given cues via ST for throat clear and swallow /double swallow    Cognition  Observed memory impairments include Decreased short-term memory, Decreased long-term memory, and Decreased working memory.   Observed attention impairments include None/intact.   Processing speed is Delayed. mildly    The patient demonstrates the ability to follow Two-step commands. WFL         Simple problem solving is Intact.           Safety judgment is Intact.      Cognitive-Communication  Intact: Verbal Reasoning  Impaired: Convergent Reasoning and Divergent Reasoning      Motor Speech  Breath Support  Breath Support: Inadequate for speech  Breath Support Inadequate Due To: Shortness of  breath  worsens with PO intake     Intelligibility and Articulation  Intact: Phoneme  Impaired: Word-Level, Sentence-Level, and Conversation-Level    Pt with dysarthria in general secondary to missing anterior upper dentition, however decreased lingual strength noted with dyscoordination since onset CVA 2025.    Articulation  Speech Articulation: Distortions, Imprecise            Time Entry(in minutes):  Speech Sound Prod Eval w/ Lang Comp and Exp Time Entry: 20  Clinical Swallow Eval Time Entry: 15    Assessment & Plan      Diagnosis and Impressions:     Bedside Evaluation: Pt with throat clearing, change in vocal quality noted with intake of liquids, purees, solids. Pt able to achieve clear vocal quality in all trials given ST strategies. ST services warranted for further pharyngeal swallow strengthening and objective swallow assessment. Pt high risk of aspiration, with ST phone to referring provider regarding need for objective swallow assessment and request of order for swallow evaluation via FEES. Provider to review chart and fax order.     Cognitive Eval: Pt presents with mild to moderate word finding and recall difficulties warranting skilled ST services.     Personal Factors Affecting Prognosis: Cognitive impairment       Education  Education was done with Patient.           ST demonstration and written info given regarding pt specific safe swallow strategies to utilize at this time.   Swallowing Recommendations  Drink Consistency Recommendation: Thin (IDDSI 0) via Cup  Food Consistency Recommendation: Soft and bite-sized (IDDSI 6)    Effective Swallowing Strategies: Alternating liquids and solids, Double swallow, Throat clear/cough     Plan  From a speech language pathology perspective, the patient would benefit from: Skilled Rehab Services  Planned therapy interventions and modalities include: Swallowing/dysphagia therapy and Cognitive therapy.        Planned instrumental assessments to include: Flexible  "endoscopic evaluation of swallowing and Other (Comment). Following receiving of order via patient provider    Visit Frequency: 2 times Per Week for 8 Weeks.       This plan was discussed with Patient.   Discussion participants: Agreed Upon Plan of Care  Plan details: Skilled ST services 2x/week x8 weeks regarding dysphagia, cognitive impairment    The patient's spiritual, cultural, and educational needs were considered, and the patient is agreeable to the plan of care and goals.     Goals:   Active       SLP       SLP Goal       Start:  08/05/25       LTG:   Patient will tolerate least restrictive diet with no overt s/sx aspiration.  Patient will demonstrate word finding skills in basic to moderate conversational tasks with 90% accuracy.   STG:  Patient will complete objective swallow assessment.   Patient will complete lingual base retractions exercises with 90% accuracy.  Patient will complete hard glottal attacks and vocal fold adduction/airway protection exercises with  90% accuracy.  Patient will complete pharyngeal strengthening exercises with 90% accuracy.    Patient will answer "wh" questions with 80% accuracy Independently.   Patient will complete divergent naming tasks with 80% accuracy Independently.   Patient will complete convergent naming tasks with 80% accuracy Independently.   Patient will complete short term memory tasks with 80% accuracy Independently.               Christine Suárez CCC-SLP    Physician Signature: _________________________________________________    "

## 2025-08-05 NOTE — LETTER
August 5, 2025  JENNIFER Espinal  [address] [address]      To whom it may concern,     The attached plan of care is being sent to you for review and reference.    You may indicate your approval by signing the document electronically, or by faxing/mailing a signed copy of the final page of this document back to the attention of MARITA FarnsworthSLP:         Plan of Care 8/5/25   Effective from: 8/5/2025  Effective to: 9/30/2025    Plan ID: [id number]            Participants as of Finalize on 8/5/2025    Name Type Comments Contact Info    JENNIFER Espinal Referring Provider  [phone]    Christine Suárez CCC-SLP Speech Language Pathologist         Last Plan Note     Author: Christine Suárez CCC-SLP Status: Signed Last edited: 8/5/2025 11:00 AM         Outpatient Rehab    Speech-Language Pathology Evaluation    Patient Name: Jono eKita  MRN: [id number]  YOB: 1960  Encounter Date: 8/5/2025    Therapy Diagnosis:   Encounter Diagnoses   Name Primary?    History of CVA (cerebrovascular accident) Yes    Right sided weakness     Dysphagia, unspecified type     Cognitive deficit as late effect of cerebrovascular accident (CVA)      Physician: Ramos Christian,*    Physician Orders: Eval and Treat  Medical Diagnosis: Personal history of stroke with residual effects  Right sided weakness  Dysphagia, unspecified type  Surgical Diagnosis: Not applicable for this Episode   Surgical Date: Not applicable for this Episode  Days Since Last Surgery: Not applicable for this Episode    Visit # / Visits Authorized: 1 / 1   Insurance Authorization Period: 7/24/2025 to 7/24/2026  Date of Evaluation: 8/5/2025  Plan of Care Certification: 8/5/2025 to 9/30/25     Time In: 1105   Time Out: 1140  Total Time (in minutes): 35   Total Billable Time (in minutes):   20 Speech Language Eval, 15 Swallow Dysfunction Eval    Precautions: Standard, Aspiration, Fall        Subjective   History of Present Illness  Jono is a 64 y.o. male who reports to Speech-Language Pathology with a chief concern of Difficulty swallowing, shortness of breath, word finding/memory.     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       Prior Level of Function: Independent with swallow, cognition prior to CVA  Current Level of Function: Word finding difficulty, shortness of breath with meals/coughing and throat clearing    History of Present Condition/Illness: Pt with onset of recent CVA in April 2025 affecting R side, acute hospital for rehabilitation then admission to [facility] during months of April and May. Pt returns to [facility] secondary to ongoing and worsening difficulty with swallow, recall, word finding, shortness of breath.     Treatment History  Discharged From Past 30 Days: Skilled nursing facility     Past Medical History/Physical Systems Review:   Jono Keita  has no past medical history on file.    Jono Keita  has a past surgical history that includes Back surgery and Knee surgery.    Jono has a current medication list which includes the following prescription(s): amlodipine, apixaban, atorvastatin, dapagliflozin propanediol, fluticasone propionate, gabapentin, and tamsulosin.    Review of patient's allergies indicates:  No Known Allergies     Objective   Oral Peripheral Exam  Dentition and Oral Hygiene  The patient's current dental condition: Missing upper dentition.   Oral hygiene is Good.  Buccal and Oral Mucosa Exam  Buccal sensation-CN V-is Normal. Buccal strength-CN VII-is Normal. Oral mucosa observations: Normal  Labial Exam     Range of motion-CN VII-is Normal.                Lingual Exam  Range of motion-CN XII-is Decreased retraction. Strength-CN XII-is Decreased right.               Jaw Exam  Range of motion-CN V-is Normal.               Bedside Evaluation  Thin liquids via cup edge x2, via straw sip x3  Puree via spoon x4  MCS trial x2          Findings: Pt with noted throat clearing, wet vocal quality with liquid intake, mild throat clear after solids. Pt able to achieve clear vocal quality given cues via ST for throat clear and swallow /double swallow    Cognition  Observed memory impairments include Decreased short-term memory, Decreased long-term memory, and Decreased working memory.   Observed attention impairments include None/intact.   Processing speed is Delayed. mildly    The patient demonstrates the ability to follow Two-step commands. WFL         Simple problem solving is Intact.           Safety judgment is Intact.      Cognitive-Communication  Intact: Verbal Reasoning  Impaired: Convergent Reasoning and Divergent Reasoning      Motor Speech  Breath Support  Breath Support: Inadequate for speech  Breath Support Inadequate Due To: Shortness of breath  worsens with PO intake     Intelligibility and Articulation  Intact: Phoneme  Impaired: Word-Level, Sentence-Level, and Conversation-Level    Pt with dysarthria in general secondary to missing anterior upper dentition, however decreased lingual strength noted with dyscoordination since onset CVA 2025.    Articulation  Speech Articulation: Distortions, Imprecise            Time Entry(in minutes):  Speech Sound Prod Eval w/ Lang Comp and Exp Time Entry: 20  Clinical Swallow Eval Time Entry: 15    Assessment & Plan      Diagnosis and Impressions:     Bedside Evaluation: Pt with throat clearing, change in vocal quality noted with intake of liquids, purees, solids. Pt able to achieve clear vocal quality in all trials given ST strategies. ST services warranted for further pharyngeal swallow strengthening and objective swallow assessment. Pt high risk of aspiration, with ST phone to referring provider regarding need for objective swallow assessment and request of order for swallow evaluation via FEES. Provider to review chart and fax order.     Cognitive Eval: Pt presents with mild to moderate word  "finding and recall difficulties warranting skilled ST services.     Personal Factors Affecting Prognosis: Cognitive impairment       Education  Education was done with Patient.           ST demonstration and written info given regarding pt specific safe swallow strategies to utilize at this time.   Swallowing Recommendations  Drink Consistency Recommendation: Thin (IDDSI 0) via Cup  Food Consistency Recommendation: Soft and bite-sized (IDDSI 6)    Effective Swallowing Strategies: Alternating liquids and solids, Double swallow, Throat clear/cough     Plan  From a speech language pathology perspective, the patient would benefit from: Skilled Rehab Services  Planned therapy interventions and modalities include: Swallowing/dysphagia therapy and Cognitive therapy.        Planned instrumental assessments to include: Flexible endoscopic evaluation of swallowing and Other (Comment). Following receiving of order via patient provider    Visit Frequency: 2 times Per Week for 8 Weeks.       This plan was discussed with Patient.   Discussion participants: Agreed Upon Plan of Care  Plan details: Skilled ST services 2x/week x8 weeks regarding dysphagia, cognitive impairment    The patient's spiritual, cultural, and educational needs were considered, and the patient is agreeable to the plan of care and goals.     Goals:   Active       SLP       SLP Goal       Start:  08/05/25       LTG:   Patient will tolerate least restrictive diet with no overt s/sx aspiration.  Patient will demonstrate word finding skills in basic to moderate conversational tasks with 90% accuracy.   STG:  Patient will complete objective swallow assessment.   Patient will complete lingual base retractions exercises with 90% accuracy.  Patient will complete hard glottal attacks and vocal fold adduction/airway protection exercises with  90% accuracy.  Patient will complete pharyngeal strengthening exercises with 90% accuracy.    Patient will answer "wh" questions " with 80% accuracy Independently.   Patient will complete divergent naming tasks with 80% accuracy Independently.   Patient will complete convergent naming tasks with 80% accuracy Independently.   Patient will complete short term memory tasks with 80% accuracy Independently.               Christine Suárez CCC-SLP    Physician Signature: _________________________________________________           Current Participants as of 8/5/2025    Name Type Comments Contact Info    JENNIFER Espinal Referring Provider  [phone]    Signature pending    Christine Suárez CCC-SLP Speech Language Pathologist      Signature pending            Sincerely,      Christine Suárez CCC-SLP  [facility]                                                            Dear Christine Suárez CCC-SUMANTH,    RE: Mr. Jono Keita, MRN: [id number]    I certify that I have reviewed the attached plan of care and agree to the details within.        ___________________________  ___________________________  Provider Printed Name   Provider Signed Name      ___________________________  Date and Time

## 2025-08-07 ENCOUNTER — CLINICAL SUPPORT (OUTPATIENT)
Dept: REHABILITATION | Facility: HOSPITAL | Age: 65
End: 2025-08-07
Payer: MEDICARE

## 2025-08-07 DIAGNOSIS — I69.319 COGNITIVE DEFICIT AS LATE EFFECT OF CEREBROVASCULAR ACCIDENT (CVA): ICD-10-CM

## 2025-08-07 DIAGNOSIS — R53.1 RIGHT SIDED WEAKNESS: Primary | ICD-10-CM

## 2025-08-07 DIAGNOSIS — R13.10 DYSPHAGIA, UNSPECIFIED TYPE: ICD-10-CM

## 2025-08-07 DIAGNOSIS — Z86.73 HISTORY OF CVA (CEREBROVASCULAR ACCIDENT): Primary | ICD-10-CM

## 2025-08-07 PROCEDURE — 97530 THERAPEUTIC ACTIVITIES: CPT

## 2025-08-07 PROCEDURE — 92507 TX SP LANG VOICE COMM INDIV: CPT | Mod: KX

## 2025-08-07 PROCEDURE — 97112 NEUROMUSCULAR REEDUCATION: CPT

## 2025-08-07 PROCEDURE — 97110 THERAPEUTIC EXERCISES: CPT

## 2025-08-07 PROCEDURE — 92526 ORAL FUNCTION THERAPY: CPT | Mod: KX

## 2025-08-07 NOTE — PROGRESS NOTES
Outpatient Rehab    Occupational Therapy Visit    Patient Name: Jono eKita  MRN: [id number]  YOB: 1960  Encounter Date: 8/7/2025    Therapy Diagnosis:   Encounter Diagnosis   Name Primary?    Right sided weakness Yes     Physician: Ramos Christian,*    Physician Orders: Eval and Treat  Medical Diagnosis: Personal history of stroke with current residual effects  Right sided weakness  Dysphagia, unspecified type  Surgical Diagnosis: Not applicable for this Episode   Surgical Date: Not applicable for this Episode  Days Since Last Surgery: Not applicable for this Episode    Visit # / Visits Authorized: 2 / 12  Insurance Authorization Period: 7/29/2025 to 9/29/2025  Date of Evaluation: 7/29/2025  Plan of Care Certification: 7/29/2025 to 9/29/2025      Time In: 1011   Time Out: 1055  Total Time (in minutes): 44   Total Billable Time (in minutes): 44    FOTO:  Intake Score (%): 57  Survey Score 2 (%): Not applicable for this Episode  Survey Score 3 (%): Not applicable for this Episode    Precautions:       Subjective   it's getting better.  Pain reported as 6/10. arm/shoulder/neck    Objective            Treatment:  Therapeutic Exercise  TE 1: UBE x 6 min level 3 resist, F/R motions per OT cueing  TE 2: Lat pulls and tricep presses x 30 reps x 2 sets each, seated, requring instruction and correcting for proper technique; fatigued and required fair RB in between sets.  TE 4: 3# dumbell elbow 3 way  TE 5: 2# dumbell shoulder flexion, abduction, chest press, seated  Therapeutic Activity  TA 1: GREEN theraputty small object search x 10 min    Time Entry(in minutes):  Therapeutic Activity Time Entry: 12  Therapeutic Exercise Time Entry: 32    Assessment & Plan   Assessment: Pt feels ok today, still complaining of right arm and neck pain as well as right knee pain, too. Tolerated tx fine overall.       The patient will continue to benefit from skilled outpatient occupational therapy in order to address the  deficits listed in the problem list on the initial evaluation, provide patient and family education, and maximize the patients level of independence in the home and community environments.     The patient's spiritual, cultural, and educational needs were considered, and the patient is agreeable to the plan of care and goals.     Education  Education was done with Patient. The patient's learning style includes Demonstration and Listening. The patient Demonstrates understanding, Verbalizes understanding, and Requires continuing/additional education.         New exercises introduced today as noted on treatment section       Plan: cont poc    Goals:   Active       Dressing       Patient will zip pants with reports of no difficulty (Progressing)       Start:  07/29/25    Expected End:  08/22/25            Patient will button pants with reports of no difficulty by pt  (Progressing)       Start:  07/29/25    Expected End:  08/22/25               Eating       Patient will use normal utensils independently and with no difficulty to consume daily meals  (Progressing)       Start:  07/29/25    Expected End:  08/15/25            Patient is able to open items independently to access meal items no extra time needed and with no difficulty (Progressing)       Start:  07/29/25    Expected End:  08/22/25               Functional outcome       Patient will show a significant change in FOTO patient-reported outcome tool to demonstrate subjective improvement (Progressing)       Start:  07/29/25    Expected End:  08/22/25            Patient will demonstrate independence in home program for support of progression (Progressing)       Start:  07/29/25    Expected End:  08/15/25               Home activities       Patient will perform household outdoor maintenance tasks with no difficulty per pt choice of task (Progressing)       Start:  07/29/25    Expected End:  08/22/25               Hygiene and grooming       Patient will wash face with  reports of no difficulty (Met)       Start:  07/29/25    Expected End:  08/15/25    Resolved:  08/07/25         Patient will shave face with reports of no difficulty per pt report (Progressing)       Start:  07/29/25    Expected End:  08/15/25               LTG       Pt will improve to WFL AROM of RUE with all ADL and IADL per FOTO assessment. (Progressing)       Start:  08/05/25    Expected End:  09/26/25               Leisure activities       Patient will participate in fishing activity with reports of minimal difficulty (Progressing)       Start:  07/29/25    Expected End:  08/15/25               Lifting & carrying objects       Patient will lift water glass for drinking with no difficulty per his report (Progressing)       Start:  07/29/25    Expected End:  08/15/25            Patient will carry his dinner plate from stovetop to kitchen table with reports of no difficulty per pt (Progressing)       Start:  07/29/25    Expected End:  08/15/25               Pain       Patient will report pain of 3/10 demonstrating a reduction of overall pain with movement and reaching (Progressing)       Start:  07/29/25    Expected End:  08/15/25               Range of Motion       Patient will achieve right shoulder flexion of 115 degrees (Progressing)       Start:  07/29/25    Expected End:  08/15/25            Patient will achieve right shoulder internal rotation of 45 degrees in 45 degrees abd (Progressing)       Start:  07/29/25    Expected End:  08/15/25               Strength       Patient will achieve right shoulder flexion strength of 4/5 (Progressing)       Start:  07/29/25    Expected End:  08/15/25            Patient will achieve right shoulder external rotation strength of 4/5 in 90 degrees abd (Progressing)       Start:  07/29/25    Expected End:  08/22/25            Patient will achieve right shoulder internal rotation strength of 4/5 in 45 degrees abd (Progressing)       Start:  07/29/25    Expected End:  08/22/25                Toileting       Patient will perform toileting hygiene for bowel movement with no difficulty per pt report (Progressing)       Start:  07/29/25    Expected End:  08/22/25              Resolved       Fine hand motor use       Patient will grasp wash cloth effectively and with reports of no diffulty per pt report (Met)       Start:  07/29/25    Expected End:  08/15/25    Resolved:  08/07/25             Danna Reyes, OT

## 2025-08-07 NOTE — PROGRESS NOTES
Outpatient Rehab    Speech-Language Pathology Visit    Patient Name: Jono Keita  MRN: [id number]  YOB: 1960  Encounter Date: 8/7/2025    Therapy Diagnosis:   Encounter Diagnoses   Name Primary?    History of CVA (cerebrovascular accident) Yes    Dysphagia, unspecified type     Cognitive deficit as late effect of cerebrovascular accident (CVA)      Physician: Ramos Christian,*    Physician Orders: Eval and Treat  Medical Diagnosis: Personal history of stroke with residual effects  Right sided weakness  Dysphagia, unspecified type  Surgical Diagnosis: Not applicable for this Episode   Surgical Date: Not applicable for this Episode  Days Since Last Surgery: Not applicable for this Episode    Visit # / Visits Authorized: 1 / 16   Insurance Authorization Period: 8/5/2025 to 9/30/2025  Date of Evaluation: 8/5/2025   Plan of Care Certification: 8/5/2025 to 9/30/2025      Time In: 0935   Time Out: 1010  Total Time (in minutes): 35   Total Billable Time (in minutes): 35      Precautions: Aspiration, Standard, Fall       Subjective   Pt on time for skilled ST appt..  Pain reported as 6/10. shoulder and hip pain      Objective            Treatment  Swallow  Activity 1: Pt tolerate NMES at 13.5 mA this date for 35 minutes.  Activity 2: During above, pt completed pharyngeal strengthening tasks x6 sets of 10 with model and verbal instructions.  Activity 3: During above, pt completed tasks for airway protection, vocal fold adduction across 3 sets of 10.  Activity 4: Pt intake of thin liquids via cup edge x25 with effortful swallows during use of NMES. NO overt s/sx aspiration this date utilizing double swallow as educated via ST  Cognitive Skills  Activity 1: Pt completed tasks for moderate level word finding with 94% accuracy, min delay.  Activity 2: Pt completed task for word deduction with 86% accuracy.  Activity 3: Pt completed task for naming 3-4 members of category with 88% accuracy, min delay.    Time  Entry(in minutes):  Speech Treatment (Individual) Time Entry: 15  Swallow/Oral Function Treatment Time Entry: 20    Assessment & Plan   Assessment  Bedside Evaluation: Pt with throat clearing, change in vocal quality noted with intake of liquids, purees, solids. Pt able to achieve clear vocal quality in all trials given ST strategies. ST services warranted for further pharyngeal swallow strengthening and objective swallow assessment. Pt high risk of aspiration, with ST phone to referring provider regarding need for objective swallow assessment and request of order for swallow evaluation via FEES. Provider to review chart and fax order.    Cognitive Eval: Pt presents with mild to moderate word finding and recall difficulties warranting skilled ST services.    Personal Factors Affecting Prognosis: Cognitive impairment     Education Education was done with Patient.          ST demonstration and written info given regarding pt specific safe swallow strategies to utilize at this time.  Swallowing Recommendations Drink Consistency Recommendation: Thin (IDDSI 0) via Cup Food Consistency Recommendation: Soft and bite-sized (IDDSI 6)   Effective Swallowing Strategies: Alternating liquids and solids, Double swallow, Throat clear/cough        The patient will continue to benefit from skilled outpatient speech therapy in order to address the deficits listed in the problem list on the initial evaluation, provide patient and family education, and maximize the patients level of independence in the home and community environments.     The patient's spiritual, cultural, and educational needs were considered, and the patient is agreeable to the plan of care and goals.          Plan  From a speech language pathology perspective, the patient would benefit from: Skilled Rehab Services  Planned therapy interventions and modalities include: Swallowing/dysphagia therapy and Cognitive therapy.        Planned instrumental assessments to  "include: Flexible endoscopic evaluation of swallowing and Other (Comment). Following receiving of order via patient provider     Visit Frequency: 2 times Per Week for 8 Weeks.        This plan was discussed with Patient.   Discussion participants: Agreed Upon Plan of Care  Plan details: Skilled ST services 2x/week x8 weeks regarding dysphagia, cognitive impairment          Goals:   Active       SLP       SLP Goal       Start:  08/05/25       LTG:   Patient will tolerate least restrictive diet with no overt s/sx aspiration.  Patient will demonstrate word finding skills in basic to moderate conversational tasks with 90% accuracy.   STG:  Patient will complete objective swallow assessment.   Patient will complete lingual base retractions exercises with 90% accuracy.  Patient will complete hard glottal attacks and vocal fold adduction/airway protection exercises with  90% accuracy.  Patient will complete pharyngeal strengthening exercises with 90% accuracy.    Patient will answer "wh" questions with 80% accuracy Independently.   Patient will complete divergent naming tasks with 80% accuracy Independently.   Patient will complete convergent naming tasks with 80% accuracy Independently.   Patient will complete short term memory tasks with 80% accuracy Independently.               Christine Suárez, CCC-SLP    "

## 2025-08-07 NOTE — PROGRESS NOTES
Outpatient Rehab    Physical Therapy Visit    Patient Name: Jono Keita  MRN: [id number]  YOB: 1960  Encounter Date: 8/7/2025    Therapy Diagnosis:   Encounter Diagnosis   Name Primary?    Right sided weakness Yes     Physician: Ramos Christian,*    Physician Orders: Eval and Treat  Medical Diagnosis: Personal history of stroke with current residual effects  Right sided weakness  Dysphagia, unspecified type  Surgical Diagnosis: Not applicable for this Episode   Surgical Date: Not applicable for this Episode  Days Since Last Surgery: Not applicable for this Episode    Visit # / Visits Authorized:  2 / 12  Insurance Authorization Period: 7/29/2025 to 9/12/2025  Date of Evaluation:   Plan of Care Certification:       PT/PTA:     Number of PTA visits since last PT visit:   Time In: 1100   Time Out: 1145  Total Time (in minutes): 45   Total Billable Time (in minutes): 45    FOTO:  Intake Score (%): 57  Survey Score 2 (%): 61  Survey Score 3 (%):     Precautions:         Subjective   Patient states his R leg is sore.  Pain reported as 4/10.      Objective            Treatment:  Balance/Neuromuscular Re-Education  NMR 1: skc,piriformis 3 x 30 s/h  NMR 2: lumbar rotation 2 x10  NMR 3: balance activity for side stepping and tandem walking  Therapeutic Activity  TA 1: nu step x 6 min for hip mobility and endurance  TA 2: Standing hip flexion, ABD, knee flexion 2 x 10 with 3#  TA 3: slant board for gastroc soleus strech 3 x 10  TA 4: Front step up and sit to stand 2x 10  TA 5: Bridging and clamshells with red t band 2 x 10    Time Entry(in minutes):  Neuromuscular Re-Education Time Entry: 15  Therapeutic Activity Time Entry: 30    Assessment & Plan   Assessment: Patient tolerated treatment well and demonstrates improved gait stride after treatment today.       The patient will continue to benefit from skilled outpatient physical therapy in order to address the deficits listed in the problem list on the  initial evaluation, provide patient and family education, and maximize the patients level of independence in the home and community environments.     The patient's spiritual, cultural, and educational needs were considered, and the patient is agreeable to the plan of care and goals.           Plan: continue PT as per POC    Goals:     Jimy Tapia, PT

## 2025-08-12 ENCOUNTER — CLINICAL SUPPORT (OUTPATIENT)
Dept: REHABILITATION | Facility: HOSPITAL | Age: 65
End: 2025-08-12
Payer: MEDICARE

## 2025-08-12 DIAGNOSIS — I69.319 COGNITIVE DEFICIT AS LATE EFFECT OF CEREBROVASCULAR ACCIDENT (CVA): ICD-10-CM

## 2025-08-12 DIAGNOSIS — Z86.73 HISTORY OF CVA (CEREBROVASCULAR ACCIDENT): Primary | ICD-10-CM

## 2025-08-12 DIAGNOSIS — R13.10 DYSPHAGIA, UNSPECIFIED TYPE: Primary | ICD-10-CM

## 2025-08-12 DIAGNOSIS — R53.1 RIGHT SIDED WEAKNESS: Primary | ICD-10-CM

## 2025-08-12 DIAGNOSIS — Z86.73 HISTORY OF CVA (CEREBROVASCULAR ACCIDENT): ICD-10-CM

## 2025-08-12 PROCEDURE — 97110 THERAPEUTIC EXERCISES: CPT

## 2025-08-12 PROCEDURE — 97112 NEUROMUSCULAR REEDUCATION: CPT | Mod: CQ

## 2025-08-12 PROCEDURE — 97530 THERAPEUTIC ACTIVITIES: CPT

## 2025-08-12 PROCEDURE — 92526 ORAL FUNCTION THERAPY: CPT

## 2025-08-12 PROCEDURE — 92507 TX SP LANG VOICE COMM INDIV: CPT

## 2025-08-12 PROCEDURE — 97110 THERAPEUTIC EXERCISES: CPT | Mod: CQ

## 2025-08-14 ENCOUNTER — CLINICAL SUPPORT (OUTPATIENT)
Dept: REHABILITATION | Facility: HOSPITAL | Age: 65
End: 2025-08-14
Payer: MEDICARE

## 2025-08-14 DIAGNOSIS — R13.10 DYSPHAGIA, UNSPECIFIED TYPE: ICD-10-CM

## 2025-08-14 DIAGNOSIS — R53.1 RIGHT SIDED WEAKNESS: Primary | ICD-10-CM

## 2025-08-14 DIAGNOSIS — Z86.73 HISTORY OF CVA (CEREBROVASCULAR ACCIDENT): Primary | ICD-10-CM

## 2025-08-14 DIAGNOSIS — I69.319 COGNITIVE DEFICIT AS LATE EFFECT OF CEREBROVASCULAR ACCIDENT (CVA): ICD-10-CM

## 2025-08-14 PROCEDURE — 92507 TX SP LANG VOICE COMM INDIV: CPT | Mod: KX

## 2025-08-14 PROCEDURE — 97010 HOT OR COLD PACKS THERAPY: CPT

## 2025-08-14 PROCEDURE — 97530 THERAPEUTIC ACTIVITIES: CPT

## 2025-08-14 PROCEDURE — 92526 ORAL FUNCTION THERAPY: CPT | Mod: KX

## 2025-08-14 PROCEDURE — 97530 THERAPEUTIC ACTIVITIES: CPT | Mod: CQ

## 2025-08-14 PROCEDURE — 97110 THERAPEUTIC EXERCISES: CPT

## 2025-08-14 PROCEDURE — 97112 NEUROMUSCULAR REEDUCATION: CPT | Mod: CQ

## 2025-08-19 ENCOUNTER — CLINICAL SUPPORT (OUTPATIENT)
Dept: REHABILITATION | Facility: HOSPITAL | Age: 65
End: 2025-08-19
Payer: MEDICARE

## 2025-08-19 DIAGNOSIS — R53.1 RIGHT SIDED WEAKNESS: Primary | ICD-10-CM

## 2025-08-19 DIAGNOSIS — R13.10 DYSPHAGIA, UNSPECIFIED TYPE: ICD-10-CM

## 2025-08-19 DIAGNOSIS — Z86.73 HISTORY OF CVA (CEREBROVASCULAR ACCIDENT): Primary | ICD-10-CM

## 2025-08-19 DIAGNOSIS — I69.319 COGNITIVE DEFICIT AS LATE EFFECT OF CEREBROVASCULAR ACCIDENT (CVA): ICD-10-CM

## 2025-08-19 PROCEDURE — 97110 THERAPEUTIC EXERCISES: CPT

## 2025-08-19 PROCEDURE — 92526 ORAL FUNCTION THERAPY: CPT | Mod: KX

## 2025-08-19 PROCEDURE — 97112 NEUROMUSCULAR REEDUCATION: CPT | Mod: CQ

## 2025-08-19 PROCEDURE — 92507 TX SP LANG VOICE COMM INDIV: CPT

## 2025-08-19 PROCEDURE — 97110 THERAPEUTIC EXERCISES: CPT | Mod: CQ

## 2025-08-19 PROCEDURE — 97530 THERAPEUTIC ACTIVITIES: CPT

## 2025-08-21 ENCOUNTER — CLINICAL SUPPORT (OUTPATIENT)
Dept: REHABILITATION | Facility: HOSPITAL | Age: 65
End: 2025-08-21
Payer: MEDICARE

## 2025-08-21 DIAGNOSIS — R13.10 DYSPHAGIA, UNSPECIFIED TYPE: ICD-10-CM

## 2025-08-21 DIAGNOSIS — R53.1 RIGHT SIDED WEAKNESS: Primary | ICD-10-CM

## 2025-08-21 DIAGNOSIS — I69.319 COGNITIVE DEFICIT AS LATE EFFECT OF CEREBROVASCULAR ACCIDENT (CVA): ICD-10-CM

## 2025-08-21 PROCEDURE — 97110 THERAPEUTIC EXERCISES: CPT

## 2025-08-21 PROCEDURE — 92526 ORAL FUNCTION THERAPY: CPT | Mod: KX

## 2025-08-21 PROCEDURE — 97112 NEUROMUSCULAR REEDUCATION: CPT

## 2025-08-21 PROCEDURE — 97530 THERAPEUTIC ACTIVITIES: CPT

## 2025-08-25 ENCOUNTER — DOCUMENTATION ONLY (OUTPATIENT)
Dept: REHABILITATION | Facility: HOSPITAL | Age: 65
End: 2025-08-25
Payer: MEDICARE

## 2025-08-26 ENCOUNTER — CLINICAL SUPPORT (OUTPATIENT)
Dept: REHABILITATION | Facility: HOSPITAL | Age: 65
End: 2025-08-26
Payer: MEDICARE

## 2025-08-26 DIAGNOSIS — R53.81 DEBILITY: ICD-10-CM

## 2025-08-26 DIAGNOSIS — R53.1 RIGHT SIDED WEAKNESS: Primary | ICD-10-CM

## 2025-08-26 PROCEDURE — 97530 THERAPEUTIC ACTIVITIES: CPT

## 2025-08-26 PROCEDURE — 97112 NEUROMUSCULAR REEDUCATION: CPT

## 2025-08-26 PROCEDURE — 97110 THERAPEUTIC EXERCISES: CPT

## 2025-08-29 ENCOUNTER — CLINICAL SUPPORT (OUTPATIENT)
Dept: REHABILITATION | Facility: HOSPITAL | Age: 65
End: 2025-08-29
Payer: MEDICARE

## 2025-08-29 DIAGNOSIS — R53.1 RIGHT SIDED WEAKNESS: Primary | ICD-10-CM

## 2025-08-29 PROCEDURE — 97112 NEUROMUSCULAR REEDUCATION: CPT

## 2025-08-29 PROCEDURE — 97530 THERAPEUTIC ACTIVITIES: CPT

## 2025-08-29 PROCEDURE — 97110 THERAPEUTIC EXERCISES: CPT

## 2025-09-02 ENCOUNTER — CLINICAL SUPPORT (OUTPATIENT)
Dept: REHABILITATION | Facility: HOSPITAL | Age: 65
End: 2025-09-02
Payer: MEDICARE

## 2025-09-02 DIAGNOSIS — R53.1 RIGHT SIDED WEAKNESS: Primary | ICD-10-CM

## 2025-09-02 PROCEDURE — 97530 THERAPEUTIC ACTIVITIES: CPT

## 2025-09-02 PROCEDURE — 97112 NEUROMUSCULAR REEDUCATION: CPT

## 2025-09-02 PROCEDURE — 97110 THERAPEUTIC EXERCISES: CPT

## 2025-09-05 ENCOUNTER — DOCUMENTATION ONLY (OUTPATIENT)
Dept: REHABILITATION | Facility: HOSPITAL | Age: 65
End: 2025-09-05
Payer: MEDICARE

## 2025-09-05 PROBLEM — R53.1 RIGHT SIDED WEAKNESS: Status: RESOLVED | Noted: 2025-07-29 | Resolved: 2025-09-05
